# Patient Record
Sex: MALE | Race: WHITE | NOT HISPANIC OR LATINO | Employment: FULL TIME | URBAN - METROPOLITAN AREA
[De-identification: names, ages, dates, MRNs, and addresses within clinical notes are randomized per-mention and may not be internally consistent; named-entity substitution may affect disease eponyms.]

---

## 2017-05-27 ENCOUNTER — OFFICE VISIT (OUTPATIENT)
Dept: FAMILY MEDICINE | Facility: CLINIC | Age: 57
End: 2017-05-27
Payer: COMMERCIAL

## 2017-05-27 VITALS
DIASTOLIC BLOOD PRESSURE: 108 MMHG | BODY MASS INDEX: 41.8 KG/M2 | WEIGHT: 292 LBS | HEART RATE: 95 BPM | HEIGHT: 70 IN | SYSTOLIC BLOOD PRESSURE: 236 MMHG | TEMPERATURE: 100 F

## 2017-05-27 DIAGNOSIS — R05.9 COUGH: ICD-10-CM

## 2017-05-27 DIAGNOSIS — I16.0 HYPERTENSIVE URGENCY: Primary | ICD-10-CM

## 2017-05-27 DIAGNOSIS — R50.9 FEVER, UNSPECIFIED FEVER CAUSE: ICD-10-CM

## 2017-05-27 PROCEDURE — 99213 OFFICE O/P EST LOW 20 MIN: CPT | Mod: S$GLB,,, | Performed by: FAMILY MEDICINE

## 2017-05-27 PROCEDURE — 99999 PR PBB SHADOW E&M-EST. PATIENT-LVL III: CPT | Mod: PBBFAC,,, | Performed by: FAMILY MEDICINE

## 2017-05-27 RX ORDER — AMINOCAPROIC ACID 500 MG/1
5 TABLET ORAL EVERY 8 HOURS
COMMUNITY
End: 2018-02-12

## 2017-05-27 NOTE — PROGRESS NOTES
Subjective:      Patient ID: Bart Christy Jr. is a 57 y.o. male.    Chief Complaint: Cough; Fever (102 this am); and Headache    HPIeh ahd had a cough and pain in his body.  He had a fever of 102 thi Maximilian.  He had ASA for this and it came down to 101.  He has had a headache.  His bp is very high right now.  His neck is not stiff.  He has no sore throat noted.  He has had a cough and chills.  No diarrhea.  He has had tylenol with a decongestant in it, ASA for this. Coughing makes it worse and nothing makes it better.  He takes medicine for this blood pressure.    Health Maintenance Due   Topic Date Due    Hepatitis C Screening  1960    TETANUS VACCINE  02/23/1980    Pneumococcal PPSV23 (Medium Risk) (1) 02/23/1980    Colonoscopy  02/23/2012    Foot Exam  01/25/2017    Eye Exam  02/24/2017    Hemoglobin A1c  05/14/2017       Past Medical History:  Past Medical History:   Diagnosis Date    Diabetes mellitus     Diabetes mellitus, type 2     Hypertension      History reviewed. No pertinent surgical history.  Review of patient's allergies indicates:   Allergen Reactions    Byetta [exenatide] Other (See Comments)     Nausea, diarrhea, mild abdominal pains.      Current Outpatient Prescriptions on File Prior to Visit   Medication Sig Dispense Refill    aspirin (ECOTRIN) 81 MG EC tablet Take 81 mg by mouth once daily.      atenolol (TENORMIN) 50 MG tablet Take 1 tablet (50 mg total) by mouth once daily. 90 tablet 3    furosemide (LASIX) 40 MG tablet Take 40 mg by mouth once daily. Or PRN      metformin (GLUCOPHAGE) 500 MG tablet Take 2 tablets (1,000 mg total) by mouth 2 (two) times daily with meals. 60 tablet 11    SITagliptan (JANUVIA) 50 MG Tab Take 1 tablet (50 mg total) by mouth once daily. 30 tablet 0    testosterone (ANDROGEL) 1 % (50 mg/5 gram) GlPk Apply topically once daily.      valsartan (DIOVAN) 320 MG tablet Take 1 tablet (320 mg total) by mouth once daily. 30 tablet 0     "[DISCONTINUED] pantoprazole (PROTONIX) 40 MG tablet Take 40 mg by mouth once daily.       No current facility-administered medications on file prior to visit.      Social History     Social History    Marital status:      Spouse name: N/A    Number of children: N/A    Years of education: N/A     Occupational History    Not on file.     Social History Main Topics    Smoking status: Never Smoker    Smokeless tobacco: Not on file    Alcohol use 0.6 oz/week     1 Shots of liquor per week    Drug use: No    Sexual activity: Not on file     Other Topics Concern    Not on file     Social History Narrative    No narrative on file     Family History   Problem Relation Age of Onset    Diabetes Mother     Hyperlipidemia Mother     Hypertension Mother     Heart disease Mother     Colon cancer Father            Review of Systems   Constitutional: Positive for chills and fever.   Respiratory: Positive for cough. Negative for shortness of breath.    Cardiovascular: Positive for chest pain.   Gastrointestinal: Negative for diarrhea.   Neurological: Positive for headaches.       Objective:   BP (!) 236/108 (BP Location: Left arm, Patient Position: Sitting, BP Method: Manual)   Pulse 95   Temp 99.8 °F (37.7 °C) (Oral)   Ht 5' 10" (1.778 m)   Wt 132.5 kg (292 lb)   BMI 41.90 kg/m²     Physical Exam   Constitutional: He appears well-developed and well-nourished. No distress.   HENT:   Head: Normocephalic and atraumatic.   Eyes: EOM are normal. Pupils are equal, round, and reactive to light.   Neck: Normal range of motion. Neck supple. No thyromegaly present.   Cardiovascular: Normal rate, regular rhythm and normal heart sounds.  Exam reveals no gallop and no friction rub.    No murmur heard.  Pulmonary/Chest: Effort normal. No respiratory distress. He has wheezes (he ahs a faint wheeze that clears after several deep breaths.). He has no rales (he has coarse breath sounds left greater than right. ).   Skin: " He is not diaphoretic.       Assessment:     1. Hypertensive urgency    2. Fever, unspecified fever cause    3. Cough        Plan:   I do not have access to blood work, xray or IV medications/monitoring to handle this type of issue in our clinic.  Therefore he was directed to go to the ER for evaluation and management with labs, xray, EKG, and administration of medications to help get his blood pressure into control and to further evaluate the cause of his issues.  The bp may be a hyperdynamic response to his phenylephrine that he had.

## 2018-02-12 PROBLEM — N18.9 CRF (CHRONIC RENAL FAILURE): Status: ACTIVE | Noted: 2018-02-12

## 2018-02-12 PROBLEM — I21.4 NSTEMI (NON-ST ELEVATED MYOCARDIAL INFARCTION): Status: ACTIVE | Noted: 2018-02-12

## 2018-02-13 PROBLEM — I50.9 CHF (CONGESTIVE HEART FAILURE): Status: ACTIVE | Noted: 2018-02-13

## 2018-02-13 PROBLEM — G47.33 OSA (OBSTRUCTIVE SLEEP APNEA): Status: ACTIVE | Noted: 2018-02-13

## 2018-02-15 PROBLEM — N17.9 AKI (ACUTE KIDNEY INJURY): Status: ACTIVE | Noted: 2018-02-15

## 2018-02-17 PROBLEM — J96.01 ACUTE HYPOXEMIC RESPIRATORY FAILURE: Status: ACTIVE | Noted: 2018-02-17

## 2018-02-21 PROBLEM — Z72.820 SLEEP DEPRIVATION: Status: ACTIVE | Noted: 2018-02-21

## 2018-02-23 PROBLEM — R53.1 LEFT-SIDED WEAKNESS: Status: ACTIVE | Noted: 2018-02-23

## 2018-02-23 PROBLEM — I63.9 ACUTE CVA (CEREBROVASCULAR ACCIDENT): Status: ACTIVE | Noted: 2018-02-23

## 2018-02-23 PROBLEM — G93.40 ENCEPHALOPATHY: Status: ACTIVE | Noted: 2018-02-23

## 2018-02-24 PROBLEM — I63.511 ACUTE ISCHEMIC RIGHT MCA STROKE: Status: ACTIVE | Noted: 2018-02-23

## 2018-02-26 PROBLEM — I48.91 ATRIAL FIBRILLATION: Status: ACTIVE | Noted: 2018-02-26

## 2018-02-26 PROBLEM — Z72.820 SLEEP DEPRIVATION: Status: RESOLVED | Noted: 2018-02-21 | Resolved: 2018-02-26

## 2018-02-27 PROBLEM — N39.0 UTI (URINARY TRACT INFECTION): Status: ACTIVE | Noted: 2018-02-27

## 2018-02-28 PROBLEM — I31.4 PERICARDIAL EFFUSION WITH CARDIAC TAMPONADE: Status: ACTIVE | Noted: 2018-02-28

## 2018-02-28 PROBLEM — I31.39 PERICARDIAL EFFUSION WITH CARDIAC TAMPONADE: Status: ACTIVE | Noted: 2018-02-28

## 2018-02-28 PROBLEM — N18.9 CRF (CHRONIC RENAL FAILURE): Status: RESOLVED | Noted: 2018-02-12 | Resolved: 2018-02-28

## 2018-03-01 PROBLEM — R63.8 INCREASED NUTRITIONAL NEEDS: Status: ACTIVE | Noted: 2018-03-01

## 2018-03-02 PROBLEM — I31.39 PERICARDIAL EFFUSION WITH CARDIAC TAMPONADE: Status: ACTIVE | Noted: 2018-03-02

## 2018-03-02 PROBLEM — I31.4 PERICARDIAL EFFUSION WITH CARDIAC TAMPONADE: Status: ACTIVE | Noted: 2018-03-02

## 2018-03-02 PROBLEM — R63.8 INCREASED NUTRITIONAL NEEDS: Status: ACTIVE | Noted: 2018-03-02

## 2018-03-05 PROBLEM — R45.1 AGITATED: Status: ACTIVE | Noted: 2018-03-05

## 2018-03-09 PROBLEM — E63.9 INADEQUATE DIETARY ENERGY INTAKE: Status: ACTIVE | Noted: 2018-03-09

## 2018-03-10 PROBLEM — E63.9 INADEQUATE DIETARY ENERGY INTAKE: Status: ACTIVE | Noted: 2018-03-10

## 2018-03-21 PROBLEM — I50.32 CHRONIC DIASTOLIC CONGESTIVE HEART FAILURE: Status: ACTIVE | Noted: 2018-02-13

## 2018-03-21 PROBLEM — I25.10 CORONARY ARTERY DISEASE DUE TO CALCIFIED CORONARY LESION: Status: ACTIVE | Noted: 2018-03-21

## 2018-03-21 PROBLEM — I25.84 CORONARY ARTERY DISEASE DUE TO CALCIFIED CORONARY LESION: Status: ACTIVE | Noted: 2018-03-21

## 2018-03-21 PROBLEM — I48.0 PAROXYSMAL ATRIAL FIBRILLATION: Status: ACTIVE | Noted: 2018-02-26

## 2018-03-22 ENCOUNTER — OFFICE VISIT (OUTPATIENT)
Dept: VASCULAR SURGERY | Facility: CLINIC | Age: 58
End: 2018-03-22
Payer: COMMERCIAL

## 2018-03-22 VITALS
BODY MASS INDEX: 38.92 KG/M2 | SYSTOLIC BLOOD PRESSURE: 153 MMHG | WEIGHT: 278 LBS | HEIGHT: 71 IN | HEART RATE: 61 BPM | DIASTOLIC BLOOD PRESSURE: 85 MMHG

## 2018-03-22 DIAGNOSIS — Z48.89 ENCOUNTER FOR POST SURGICAL WOUND CHECK: ICD-10-CM

## 2018-03-22 DIAGNOSIS — Z95.1 S/P CABG (CORONARY ARTERY BYPASS GRAFT): Primary | ICD-10-CM

## 2018-03-22 PROCEDURE — 99999 PR PBB SHADOW E&M-EST. PATIENT-LVL III: CPT | Mod: PBBFAC,,, | Performed by: THORACIC SURGERY (CARDIOTHORACIC VASCULAR SURGERY)

## 2018-03-22 PROCEDURE — 99024 POSTOP FOLLOW-UP VISIT: CPT | Mod: S$GLB,,, | Performed by: THORACIC SURGERY (CARDIOTHORACIC VASCULAR SURGERY)

## 2018-03-22 RX ORDER — AMOXICILLIN AND CLAVULANATE POTASSIUM 500; 125 MG/1; MG/1
1 TABLET, FILM COATED ORAL 2 TIMES DAILY
Qty: 10 TABLET | Refills: 0
Start: 2018-03-22 | End: 2018-03-27

## 2018-03-23 NOTE — PROGRESS NOTES
OFFICE NOTE    This patient is a 58-year-old gentleman with severe coronary disease.  He had   coronary artery bypass procedure and this was done as an emergency and was   complicated by a number of problems, which include a small stroke and eventually   pericardial effusion requiring drainage and renal insufficiency, which largely   has resolved as well as his other problems.  He is ambulatory at this point.  He   is home with his wife.  He is scheduled to begin cardiac rehabilitation and his   issues are well described in the EPIC record.    MEDICATIONS:  His medicines are noted.    PHYSICAL EXAMINATION:   VITAL SIGNS:  Today, his vital signs are stable.  CHEST:  Clear.  HEART:  Regular rate and rhythm.  ABDOMEN:  Benign.  EXTREMITIES:  He has some erythema around the vein harvest site on the left leg   and some skin separation just above the knee associated with a small amount of   exudate and drainage according to the patient.  Perfusion to the legs and feet   seems to be satisfactory.    At this point, he has done reasonably well.  He is recovering from a very   complicated postoperative course, but has improved.  He no longer is on   dialysis.  He does have some erythema around the incision on the leg and we will   prescribe antibiotics for this.  He can see us after this on an as needed basis   and hopefully he will continue to improve.  He will have regular followup with   his medical team including cardiologist and the Rehabilitation Service and   hopefully his prognosis will be satisfactory.      BESS  dd: 03/22/2018 09:12:44 (CDT)  td: 03/23/2018 00:22:58 (CDT)  Doc ID   #[id number]  Job ID #[id number]    CC:

## 2018-04-04 ENCOUNTER — TELEPHONE (OUTPATIENT)
Dept: VASCULAR SURGERY | Facility: CLINIC | Age: 58
End: 2018-04-04

## 2018-04-04 NOTE — TELEPHONE ENCOUNTER
----- Message from Anastasia Maxwell sent at 4/4/2018 11:50 AM CDT -----  Contact: wife Tracie Christy [phone]  She would like to know when he can return to work.  Please call her.  Thank you!

## 2018-04-04 NOTE — TELEPHONE ENCOUNTER
Ok to return to work per Dr. Chou. Advised to consult with cardiologist before returning to work. States understanding. To call if have more concerns.

## 2018-04-14 ENCOUNTER — NURSE TRIAGE (OUTPATIENT)
Dept: ADMINISTRATIVE | Facility: CLINIC | Age: 58
End: 2018-04-14

## 2018-04-14 NOTE — TELEPHONE ENCOUNTER
Had cardioversion for afib, CABG x5 on 2/14. Had window on 2/27. Having CP this am. Pt went to flip generator on over shoulders. L sternum hurts, rates pain 7. rec EMS due to cardiac symptoms. Pt states no CP but concern for sternum pain/instability. rec to get check today in ED. EMS warnings given. Call back with questions.     Reason for Disposition   Patient sounds very sick or weak to the triager    Protocols used: ST HEART ATTACK POST-HOSPITALIZATION FOLLOW-UP CALL-A-AH

## 2018-04-16 ENCOUNTER — TELEPHONE (OUTPATIENT)
Dept: VASCULAR SURGERY | Facility: CLINIC | Age: 58
End: 2018-04-16

## 2018-04-16 NOTE — TELEPHONE ENCOUNTER
----- Message from Kristen Medrano sent at 4/14/2018 11:20 AM CDT -----  Contact: Wife  Tracie Christy, wife [phone] calling because patient had bypass surgery 2/14/18 and he went to pull the lever on a generator and states his chest is hurting him very badly. Transferred to on call. Thanks!

## 2018-04-16 NOTE — TELEPHONE ENCOUNTER
I called patient to see how he was doing. He did not go to the ER as advised by the on call nurse. He states he is feeling better, he was just concerned that he may have damaged something in his chest. He is not having any pain today. I told him if he has more chest pain or has SOB we can see him in the clinic later this week, or he can go to the ER

## 2018-04-23 PROBLEM — N17.9 AKI (ACUTE KIDNEY INJURY): Status: RESOLVED | Noted: 2018-02-15 | Resolved: 2018-04-23

## 2018-04-23 PROBLEM — I31.39 PERICARDIAL EFFUSION WITH CARDIAC TAMPONADE: Status: RESOLVED | Noted: 2018-03-02 | Resolved: 2018-04-23

## 2018-04-23 PROBLEM — I25.2 HISTORY OF NON-ST ELEVATION MYOCARDIAL INFARCTION (NSTEMI): Status: ACTIVE | Noted: 2018-02-12

## 2018-04-23 PROBLEM — I31.4 PERICARDIAL EFFUSION WITH CARDIAC TAMPONADE: Status: RESOLVED | Noted: 2018-03-02 | Resolved: 2018-04-23

## 2018-04-23 PROBLEM — N39.0 UTI (URINARY TRACT INFECTION): Status: RESOLVED | Noted: 2018-02-27 | Resolved: 2018-04-23

## 2018-07-12 ENCOUNTER — OFFICE VISIT (OUTPATIENT)
Dept: VASCULAR SURGERY | Facility: CLINIC | Age: 58
End: 2018-07-12
Payer: COMMERCIAL

## 2018-07-12 VITALS
HEIGHT: 70 IN | HEART RATE: 84 BPM | BODY MASS INDEX: 40.52 KG/M2 | WEIGHT: 283 LBS | SYSTOLIC BLOOD PRESSURE: 183 MMHG | DIASTOLIC BLOOD PRESSURE: 90 MMHG

## 2018-07-12 DIAGNOSIS — Z48.89 ENCOUNTER FOR POST SURGICAL WOUND CHECK: Primary | ICD-10-CM

## 2018-07-12 PROCEDURE — 3077F SYST BP >= 140 MM HG: CPT | Mod: CPTII,S$GLB,, | Performed by: THORACIC SURGERY (CARDIOTHORACIC VASCULAR SURGERY)

## 2018-07-12 PROCEDURE — 3008F BODY MASS INDEX DOCD: CPT | Mod: CPTII,S$GLB,, | Performed by: THORACIC SURGERY (CARDIOTHORACIC VASCULAR SURGERY)

## 2018-07-12 PROCEDURE — 99212 OFFICE O/P EST SF 10 MIN: CPT | Mod: S$GLB,,, | Performed by: THORACIC SURGERY (CARDIOTHORACIC VASCULAR SURGERY)

## 2018-07-12 PROCEDURE — 99999 PR PBB SHADOW E&M-EST. PATIENT-LVL III: CPT | Mod: PBBFAC,,, | Performed by: THORACIC SURGERY (CARDIOTHORACIC VASCULAR SURGERY)

## 2018-07-12 PROCEDURE — 3080F DIAST BP >= 90 MM HG: CPT | Mod: CPTII,S$GLB,, | Performed by: THORACIC SURGERY (CARDIOTHORACIC VASCULAR SURGERY)

## 2018-07-12 RX ORDER — CARVEDILOL 25 MG/1
25 TABLET ORAL 2 TIMES DAILY
COMMUNITY
Start: 2018-06-18 | End: 2020-01-01

## 2018-07-12 RX ORDER — INSULIN DETEMIR 100 [IU]/ML
40 INJECTION, SOLUTION SUBCUTANEOUS 2 TIMES DAILY
COMMUNITY
Start: 2018-06-18 | End: 2018-11-29

## 2018-07-13 NOTE — PROGRESS NOTES
This is a 58-year-old gentleman who had emergency coronary bypass for   multivessel coronary disease on 02/14/2018.  His postoperative course was   complicated by a right hemispheric stroke involving the left upper extremity.    This is largely resolved.  The patient also had renal insufficiency and he no   longer requires dialysis.  He is active.  He was concerned now about sternal   movement with coughing and deep breathing.    MEDICATIONS:  Noted.  They are part of the EPIC record.  Her problem list is   reviewed.    On examination of his chest and chest wall, there is no evidence of infection.    With deep cough, he does have some movement of the mid sternum, but there is no   obvious displacement or paradoxical motion.  Abdomen is benign.  Perfusion to   the legs and feet seems to be satisfactory.    At this point, I think ongoing conservative management is warranted.  I told him   to allow healing to proceed for the next two to three months and treat this   conservatively.  If the problem becomes a bigger issue or he is still having   problems after this a period of time to let us know and we will have to reassess   the healing of the sternum.      BESS  dd: 07/12/2018 10:08:14 (CDT)  td: 07/13/2018 03:44:53 (CDT)  Doc ID   #[id number]  Job ID #[id number]    CC:

## 2018-07-17 ENCOUNTER — TELEPHONE (OUTPATIENT)
Dept: VASCULAR SURGERY | Facility: CLINIC | Age: 58
End: 2018-07-17

## 2018-07-17 NOTE — TELEPHONE ENCOUNTER
----- Message from Anuradha Larios sent at 7/17/2018  1:42 PM CDT -----  Type:  Patient Returning Call    Who Called:  Nicki Christy - spouse  Who Left Message for Patient:  Jessie  Does the patient know what this is regarding?:  Appointment   Best Call Back Number:  [phone]  Additional Information:

## 2018-08-01 ENCOUNTER — OFFICE VISIT (OUTPATIENT)
Dept: VASCULAR SURGERY | Facility: CLINIC | Age: 58
End: 2018-08-01
Payer: COMMERCIAL

## 2018-08-01 VITALS — DIASTOLIC BLOOD PRESSURE: 80 MMHG | HEIGHT: 70 IN | SYSTOLIC BLOOD PRESSURE: 160 MMHG | HEART RATE: 66 BPM

## 2018-08-01 DIAGNOSIS — R07.89 STERNAL PAIN: Primary | ICD-10-CM

## 2018-08-01 PROCEDURE — 99213 OFFICE O/P EST LOW 20 MIN: CPT | Mod: S$GLB,,, | Performed by: THORACIC SURGERY (CARDIOTHORACIC VASCULAR SURGERY)

## 2018-08-01 PROCEDURE — 3077F SYST BP >= 140 MM HG: CPT | Mod: CPTII,S$GLB,, | Performed by: THORACIC SURGERY (CARDIOTHORACIC VASCULAR SURGERY)

## 2018-08-01 PROCEDURE — 3079F DIAST BP 80-89 MM HG: CPT | Mod: CPTII,S$GLB,, | Performed by: THORACIC SURGERY (CARDIOTHORACIC VASCULAR SURGERY)

## 2018-08-01 PROCEDURE — 99999 PR PBB SHADOW E&M-EST. PATIENT-LVL II: CPT | Mod: PBBFAC,,, | Performed by: THORACIC SURGERY (CARDIOTHORACIC VASCULAR SURGERY)

## 2018-08-01 RX ORDER — INSULIN LISPRO 100 [IU]/ML
INJECTION, SOLUTION INTRAVENOUS; SUBCUTANEOUS
COMMUNITY
Start: 2018-07-27 | End: 2019-01-02

## 2018-08-02 NOTE — PROGRESS NOTES
This is a 58-year-old gentleman status post coronary artery bypass grafting a   number of months ago.  He had to the protracted postoperative course involving a   renal and respiratory insufficiency.  He also needs a pericardial window.    Nevertheless, he has recovered from all these issues and is quite functional at   this point, but has developed some discomfort in the chest wall and some   movement with coughing and deep breathing at the lower aspect of the previous   sternotomy incision.    PHYSICAL EXAMINATION:  The surgical wounds are clean and dry.  There is no   evidence of infection.  His medicines are noted.  With deep coughing and   breathing, I can appreciate some movement of the lower aspect of the sternum   next, the xiphoid and to the right within the costal cartilages, but there is no   gal herniation or movement of the sternum itself.    PLAN:  At this point, I would continue medical management.  I will at least give   the sternum a few more months to heal.  If it becomes symptomatic issue, then   he will have to have the sternum rewired at some stage, but at this point, I   would not do this.  Conservative management is indicated.      BESS  dd: 08/01/2018 15:17:35 (CDT)  td: 08/02/2018 05:40:50 (CDT)  Doc ID   #[id number]  Job ID #[id number]    CC:

## 2018-10-04 ENCOUNTER — OFFICE VISIT (OUTPATIENT)
Dept: VASCULAR SURGERY | Facility: CLINIC | Age: 58
End: 2018-10-04
Payer: COMMERCIAL

## 2018-10-04 VITALS
BODY MASS INDEX: 40.37 KG/M2 | HEART RATE: 68 BPM | SYSTOLIC BLOOD PRESSURE: 180 MMHG | DIASTOLIC BLOOD PRESSURE: 86 MMHG | HEIGHT: 70 IN | WEIGHT: 282 LBS

## 2018-10-04 DIAGNOSIS — S22.23XS: Primary | ICD-10-CM

## 2018-10-04 PROCEDURE — 99214 OFFICE O/P EST MOD 30 MIN: CPT | Mod: S$GLB,,, | Performed by: THORACIC SURGERY (CARDIOTHORACIC VASCULAR SURGERY)

## 2018-10-04 PROCEDURE — 3077F SYST BP >= 140 MM HG: CPT | Mod: CPTII,S$GLB,, | Performed by: THORACIC SURGERY (CARDIOTHORACIC VASCULAR SURGERY)

## 2018-10-04 PROCEDURE — 99999 PR PBB SHADOW E&M-EST. PATIENT-LVL III: CPT | Mod: PBBFAC,,, | Performed by: THORACIC SURGERY (CARDIOTHORACIC VASCULAR SURGERY)

## 2018-10-04 PROCEDURE — 3079F DIAST BP 80-89 MM HG: CPT | Mod: CPTII,S$GLB,, | Performed by: THORACIC SURGERY (CARDIOTHORACIC VASCULAR SURGERY)

## 2018-10-04 PROCEDURE — 3008F BODY MASS INDEX DOCD: CPT | Mod: CPTII,S$GLB,, | Performed by: THORACIC SURGERY (CARDIOTHORACIC VASCULAR SURGERY)

## 2018-10-04 RX ORDER — PANTOPRAZOLE SODIUM 40 MG/1
40 TABLET, DELAYED RELEASE ORAL
COMMUNITY
Start: 2015-04-14 | End: 2018-10-18

## 2018-10-04 RX ORDER — LOSARTAN POTASSIUM 50 MG/1
50 TABLET ORAL
COMMUNITY
Start: 2014-11-05 | End: 2018-10-18

## 2018-10-04 RX ORDER — GLIPIZIDE 2.5 MG/1
2.5 TABLET, EXTENDED RELEASE ORAL 2 TIMES DAILY
COMMUNITY
Start: 2018-08-21 | End: 2020-01-01

## 2018-10-04 NOTE — PROGRESS NOTES
OFFICE NOTE    This is a 58-year-old gentleman with a history of coronary artery disease and   coronary artery bypass grafting over six months ago.  He comes back to the   office today with pain in the sternum and what he feels as clicking movement   with coughing and deep breathing and pain related to this.  His medicines are   noted.  They are part of recent EPIC records.  His problem list is reviewed.  He   is on Xarelto for episodic atrial fibrillation, but he has not had this   particular arrhythmia for a fairly long period of time.  Medicines are part of   the EPIC record.  He is a longstanding diabetic and hypertensive and he quit   smoking within the last couple of years.    PHYSICAL EXAMINATION:  VITAL SIGNS:  Stable.  HEENT:  Pupils are equal, round, reactive to light.  Nose and throat are clear.  NECK:  Supple.  CHEST:  Clear to auscultation.  HEART:  Regular rate and rhythm.  ABDOMEN:  Benign.  EXTREMITIES:  Femoral pulses are equal.  Perfusion to the legs and feet seems to   be satisfactory.  On closer examination of the sternum, he has had previous   sternotomy.  I can feel a very faint movement with coughing and deep breathing,   but there is no gross dehiscence of the bone.  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  The plan right now is to consider a sternal revision to   relieve his discomfort.      BESS  dd: 10/04/2018 10:44:26 (CDT)  td: 10/05/2018 09:42:28 (CDT)  Doc ID   #[id number]  Job ID #[id number]    CC:

## 2018-10-05 DIAGNOSIS — S22.23XS: Primary | ICD-10-CM

## 2018-10-12 DIAGNOSIS — M96.89 NONUNION OF STERNUM AFTER STERNOTOMY: Primary | ICD-10-CM

## 2018-10-12 RX ORDER — CHLORHEXIDINE GLUCONATE ORAL RINSE 1.2 MG/ML
10 SOLUTION DENTAL
Status: CANCELLED | OUTPATIENT
Start: 2018-10-12

## 2018-10-12 RX ORDER — MUPIROCIN 20 MG/G
OINTMENT TOPICAL
Status: CANCELLED | OUTPATIENT
Start: 2018-10-12

## 2018-10-16 ENCOUNTER — TELEPHONE (OUTPATIENT)
Dept: VASCULAR SURGERY | Facility: CLINIC | Age: 58
End: 2018-10-16

## 2018-10-16 NOTE — TELEPHONE ENCOUNTER
----- Message from Greg Cortez sent at 10/16/2018  8:32 AM CDT -----  Type: Needs Medical Advice    Who Called:  Wife/Nicki    Best Call Back Number: [phone]  Additional Information: Wife calling.  States that she needs to reschedule the patient's pre-op appointment to 10/18

## 2018-10-18 PROBLEM — N18.30 STAGE 3 CHRONIC KIDNEY DISEASE: Status: ACTIVE | Noted: 2018-10-18

## 2018-10-18 PROBLEM — Z86.73 HISTORY OF CVA (CEREBROVASCULAR ACCIDENT): Status: ACTIVE | Noted: 2018-10-18

## 2018-10-22 ENCOUNTER — TELEPHONE (OUTPATIENT)
Dept: VASCULAR SURGERY | Facility: CLINIC | Age: 58
End: 2018-10-22

## 2018-10-22 PROBLEM — I20.0 UNSTABLE ANGINA PECTORIS: Status: ACTIVE | Noted: 2018-10-22

## 2018-10-22 NOTE — TELEPHONE ENCOUNTER
----- Message from Clemencia Courtney sent at 10/22/2018 11:04 AM CDT -----  Contact: wife                                    attn:  Chely  Wife  - Tracie Christy - [phone] is calling to cancel patient's procedure with Dr Chou for this Wednesday 10 24 18 and does not want to reschedule at this time but she is asking to speak with Nurse Chely/please call

## 2018-10-25 ENCOUNTER — TELEPHONE (OUTPATIENT)
Dept: VASCULAR SURGERY | Facility: CLINIC | Age: 58
End: 2018-10-25

## 2018-10-25 NOTE — TELEPHONE ENCOUNTER
----- Message from Candace Fernandez sent at 10/25/2018  1:31 PM CDT -----  Type: Needs Medical Advice    Who Called:  Carlyn Pedro BCBRADEN    Best Call Back Number: [phone] ext [phone]  Additional Information: Stating the patient had a scheduled surgery yesterday but was not admitted, wanted to verify if it has been rescheduled

## 2019-01-01 ENCOUNTER — OFFICE VISIT (OUTPATIENT)
Dept: URGENT CARE | Facility: CLINIC | Age: 59
End: 2019-01-01
Payer: COMMERCIAL

## 2019-01-01 ENCOUNTER — TELEPHONE (OUTPATIENT)
Dept: NEUROSURGERY | Facility: CLINIC | Age: 59
End: 2019-01-01

## 2019-01-01 ENCOUNTER — TELEPHONE (OUTPATIENT)
Dept: VASCULAR SURGERY | Facility: CLINIC | Age: 59
End: 2019-01-01

## 2019-01-01 ENCOUNTER — HOSPITAL ENCOUNTER (OUTPATIENT)
Dept: RADIOLOGY | Facility: HOSPITAL | Age: 59
Discharge: HOME | End: 2019-12-12
Attending: ORTHOPAEDIC SURGERY
Payer: COMMERCIAL

## 2019-01-01 ENCOUNTER — PATIENT MESSAGE (OUTPATIENT)
Dept: VASCULAR SURGERY | Facility: CLINIC | Age: 59
End: 2019-01-01

## 2019-01-01 ENCOUNTER — PATIENT OUTREACH (OUTPATIENT)
Dept: ADMINISTRATIVE | Facility: HOSPITAL | Age: 59
End: 2019-01-01

## 2019-01-01 ENCOUNTER — TELEPHONE (OUTPATIENT)
Dept: NEUROLOGY | Facility: CLINIC | Age: 59
End: 2019-01-01

## 2019-01-01 ENCOUNTER — OFFICE VISIT (OUTPATIENT)
Dept: VASCULAR SURGERY | Facility: CLINIC | Age: 59
End: 2019-01-01
Payer: COMMERCIAL

## 2019-01-01 ENCOUNTER — OFFICE VISIT (OUTPATIENT)
Dept: NEUROSURGERY | Facility: CLINIC | Age: 59
End: 2019-01-01
Payer: COMMERCIAL

## 2019-01-01 VITALS
OXYGEN SATURATION: 98 % | HEIGHT: 69 IN | BODY MASS INDEX: 42.06 KG/M2 | RESPIRATION RATE: 18 BRPM | WEIGHT: 284 LBS | HEART RATE: 64 BPM | DIASTOLIC BLOOD PRESSURE: 92 MMHG | TEMPERATURE: 99 F | SYSTOLIC BLOOD PRESSURE: 179 MMHG

## 2019-01-01 VITALS
HEART RATE: 69 BPM | SYSTOLIC BLOOD PRESSURE: 143 MMHG | BODY MASS INDEX: 39.22 KG/M2 | HEIGHT: 70 IN | DIASTOLIC BLOOD PRESSURE: 79 MMHG | WEIGHT: 274 LBS

## 2019-01-01 VITALS
HEART RATE: 70 BPM | WEIGHT: 283.81 LBS | SYSTOLIC BLOOD PRESSURE: 148 MMHG | DIASTOLIC BLOOD PRESSURE: 79 MMHG | BODY MASS INDEX: 41.92 KG/M2

## 2019-01-01 VITALS
SYSTOLIC BLOOD PRESSURE: 154 MMHG | DIASTOLIC BLOOD PRESSURE: 84 MMHG | BODY MASS INDEX: 41.72 KG/M2 | WEIGHT: 291.44 LBS | HEIGHT: 70 IN | HEART RATE: 68 BPM

## 2019-01-01 DIAGNOSIS — M25.511 ACUTE PAIN OF RIGHT SHOULDER: Primary | ICD-10-CM

## 2019-01-01 DIAGNOSIS — S16.1XXA STRAIN OF NECK MUSCLE, INITIAL ENCOUNTER: ICD-10-CM

## 2019-01-01 DIAGNOSIS — S42.012P: Primary | ICD-10-CM

## 2019-01-01 DIAGNOSIS — T81.32XA DISRUPTION OR DEHISCENCE OF CLOSURE OF STERNUM OR STERNOTOMY, INITIAL ENCOUNTER: ICD-10-CM

## 2019-01-01 DIAGNOSIS — I25.10 CORONARY ARTERY DISEASE, ANGINA PRESENCE UNSPECIFIED, UNSPECIFIED VESSEL OR LESION TYPE, UNSPECIFIED WHETHER NATIVE OR TRANSPLANTED HEART: Primary | ICD-10-CM

## 2019-01-01 DIAGNOSIS — S42.016P: Primary | ICD-10-CM

## 2019-01-01 DIAGNOSIS — S42.012P: ICD-10-CM

## 2019-01-01 DIAGNOSIS — M50.30 DDD (DEGENERATIVE DISC DISEASE), CERVICAL: ICD-10-CM

## 2019-01-01 DIAGNOSIS — M54.12 CERVICAL RADICULOPATHY: ICD-10-CM

## 2019-01-01 DIAGNOSIS — M54.12 CERVICAL RADICULOPATHY: Primary | ICD-10-CM

## 2019-01-01 DIAGNOSIS — R07.89 STERNAL PAIN: Primary | ICD-10-CM

## 2019-01-01 PROCEDURE — 3008F BODY MASS INDEX DOCD: CPT | Mod: CPTII,S$GLB,, | Performed by: PHYSICIAN ASSISTANT

## 2019-01-01 PROCEDURE — 72141 MRI NECK SPINE W/O DYE: CPT | Mod: 26,,, | Performed by: RADIOLOGY

## 2019-01-01 PROCEDURE — 99244 PR OFFICE CONSULTATION,LEVEL IV: ICD-10-PCS | Mod: S$GLB,,, | Performed by: STUDENT IN AN ORGANIZED HEALTH CARE EDUCATION/TRAINING PROGRAM

## 2019-01-01 PROCEDURE — 3008F PR BODY MASS INDEX (BMI) DOCUMENTED: ICD-10-PCS | Mod: CPTII,S$GLB,, | Performed by: THORACIC SURGERY (CARDIOTHORACIC VASCULAR SURGERY)

## 2019-01-01 PROCEDURE — 99024 PR POST-OP FOLLOW-UP VISIT: ICD-10-PCS | Mod: S$GLB,,, | Performed by: THORACIC SURGERY (CARDIOTHORACIC VASCULAR SURGERY)

## 2019-01-01 PROCEDURE — 3077F SYST BP >= 140 MM HG: CPT | Mod: CPTII,S$GLB,, | Performed by: THORACIC SURGERY (CARDIOTHORACIC VASCULAR SURGERY)

## 2019-01-01 PROCEDURE — 99999 PR PBB SHADOW E&M-EST. PATIENT-LVL IV: ICD-10-PCS | Mod: PBBFAC,,, | Performed by: THORACIC SURGERY (CARDIOTHORACIC VASCULAR SURGERY)

## 2019-01-01 PROCEDURE — 72141 MRI CERVICAL SPINE WITHOUT CONTRAST: ICD-10-PCS | Mod: 26,,, | Performed by: RADIOLOGY

## 2019-01-01 PROCEDURE — 99244 OFF/OP CNSLTJ NEW/EST MOD 40: CPT | Mod: S$GLB,,, | Performed by: STUDENT IN AN ORGANIZED HEALTH CARE EDUCATION/TRAINING PROGRAM

## 2019-01-01 PROCEDURE — 3078F DIAST BP <80 MM HG: CPT | Mod: CPTII,S$GLB,, | Performed by: THORACIC SURGERY (CARDIOTHORACIC VASCULAR SURGERY)

## 2019-01-01 PROCEDURE — 3077F PR MOST RECENT SYSTOLIC BLOOD PRESSURE >= 140 MM HG: ICD-10-PCS | Mod: CPTII,S$GLB,, | Performed by: THORACIC SURGERY (CARDIOTHORACIC VASCULAR SURGERY)

## 2019-01-01 PROCEDURE — 99214 PR OFFICE/OUTPT VISIT, EST, LEVL IV, 30-39 MIN: ICD-10-PCS | Mod: 25,S$GLB,, | Performed by: PHYSICIAN ASSISTANT

## 2019-01-01 PROCEDURE — 99214 OFFICE O/P EST MOD 30 MIN: CPT | Mod: S$GLB,,, | Performed by: THORACIC SURGERY (CARDIOTHORACIC VASCULAR SURGERY)

## 2019-01-01 PROCEDURE — 99024 POSTOP FOLLOW-UP VISIT: CPT | Mod: S$GLB,,, | Performed by: THORACIC SURGERY (CARDIOTHORACIC VASCULAR SURGERY)

## 2019-01-01 PROCEDURE — 3080F DIAST BP >= 90 MM HG: CPT | Mod: CPTII,S$GLB,, | Performed by: PHYSICIAN ASSISTANT

## 2019-01-01 PROCEDURE — 72141 MRI NECK SPINE W/O DYE: CPT | Mod: TC,PO

## 2019-01-01 PROCEDURE — 3080F PR MOST RECENT DIASTOLIC BLOOD PRESSURE >= 90 MM HG: ICD-10-PCS | Mod: CPTII,S$GLB,, | Performed by: PHYSICIAN ASSISTANT

## 2019-01-01 PROCEDURE — 99214 PR OFFICE/OUTPT VISIT, EST, LEVL IV, 30-39 MIN: ICD-10-PCS | Mod: S$GLB,,, | Performed by: THORACIC SURGERY (CARDIOTHORACIC VASCULAR SURGERY)

## 2019-01-01 PROCEDURE — 99999 PR PBB SHADOW E&M-EST. PATIENT-LVL III: ICD-10-PCS | Mod: PBBFAC,,, | Performed by: STUDENT IN AN ORGANIZED HEALTH CARE EDUCATION/TRAINING PROGRAM

## 2019-01-01 PROCEDURE — 3078F PR MOST RECENT DIASTOLIC BLOOD PRESSURE < 80 MM HG: ICD-10-PCS | Mod: CPTII,S$GLB,, | Performed by: THORACIC SURGERY (CARDIOTHORACIC VASCULAR SURGERY)

## 2019-01-01 PROCEDURE — 3077F PR MOST RECENT SYSTOLIC BLOOD PRESSURE >= 140 MM HG: ICD-10-PCS | Mod: CPTII,S$GLB,, | Performed by: PHYSICIAN ASSISTANT

## 2019-01-01 PROCEDURE — 99214 OFFICE O/P EST MOD 30 MIN: CPT | Mod: 25,S$GLB,, | Performed by: PHYSICIAN ASSISTANT

## 2019-01-01 PROCEDURE — 99999 PR PBB SHADOW E&M-EST. PATIENT-LVL III: CPT | Mod: PBBFAC,,, | Performed by: STUDENT IN AN ORGANIZED HEALTH CARE EDUCATION/TRAINING PROGRAM

## 2019-01-01 PROCEDURE — 96372 PR INJECTION,THERAP/PROPH/DIAG2ST, IM OR SUBCUT: ICD-10-PCS | Mod: S$GLB,,, | Performed by: PHYSICIAN ASSISTANT

## 2019-01-01 PROCEDURE — 3008F BODY MASS INDEX DOCD: CPT | Mod: CPTII,S$GLB,, | Performed by: THORACIC SURGERY (CARDIOTHORACIC VASCULAR SURGERY)

## 2019-01-01 PROCEDURE — 3008F PR BODY MASS INDEX (BMI) DOCUMENTED: ICD-10-PCS | Mod: CPTII,S$GLB,, | Performed by: PHYSICIAN ASSISTANT

## 2019-01-01 PROCEDURE — 99999 PR PBB SHADOW E&M-EST. PATIENT-LVL IV: CPT | Mod: PBBFAC,,, | Performed by: THORACIC SURGERY (CARDIOTHORACIC VASCULAR SURGERY)

## 2019-01-01 PROCEDURE — 96372 THER/PROPH/DIAG INJ SC/IM: CPT | Mod: S$GLB,,, | Performed by: PHYSICIAN ASSISTANT

## 2019-01-01 PROCEDURE — 3077F SYST BP >= 140 MM HG: CPT | Mod: CPTII,S$GLB,, | Performed by: PHYSICIAN ASSISTANT

## 2019-01-01 RX ORDER — METHYLPREDNISOLONE 4 MG/1
TABLET ORAL
Qty: 1 PACKAGE | Refills: 0 | Status: SHIPPED | OUTPATIENT
Start: 2019-01-01 | End: 2019-01-01

## 2019-01-01 RX ORDER — BETAMETHASONE SODIUM PHOSPHATE AND BETAMETHASONE ACETATE 3; 3 MG/ML; MG/ML
9 INJECTION, SUSPENSION INTRA-ARTICULAR; INTRALESIONAL; INTRAMUSCULAR; SOFT TISSUE
Status: COMPLETED | OUTPATIENT
Start: 2019-01-01 | End: 2019-01-01

## 2019-01-01 RX ORDER — SEMAGLUTIDE 1.34 MG/ML
INJECTION, SOLUTION SUBCUTANEOUS
COMMUNITY
Start: 2019-01-01

## 2019-01-01 RX ORDER — MUPIROCIN 20 MG/G
OINTMENT TOPICAL
Status: CANCELLED | OUTPATIENT
Start: 2019-01-01

## 2019-01-01 RX ORDER — CHLORHEXIDINE GLUCONATE ORAL RINSE 1.2 MG/ML
10 SOLUTION DENTAL
Status: CANCELLED | OUTPATIENT
Start: 2019-01-01

## 2019-01-01 RX ORDER — OXYCODONE AND ACETAMINOPHEN 10; 325 MG/1; MG/1
TABLET ORAL
Refills: 0 | COMMUNITY
Start: 2019-01-01 | End: 2019-01-01

## 2019-01-01 RX ORDER — INSULIN LISPRO 100 [IU]/ML
INJECTION, SOLUTION INTRAVENOUS; SUBCUTANEOUS 3 TIMES DAILY
COMMUNITY
Start: 2019-01-01

## 2019-01-01 RX ORDER — LIDOCAINE HYDROCHLORIDE 10 MG/ML
1 INJECTION, SOLUTION EPIDURAL; INFILTRATION; INTRACAUDAL; PERINEURAL ONCE
Status: CANCELLED | OUTPATIENT
Start: 2019-01-01 | End: 2019-01-01

## 2019-01-01 RX ORDER — FERROUS GLUCONATE 324(38)MG
324 TABLET ORAL
COMMUNITY
Start: 2019-01-01

## 2019-01-01 RX ORDER — DICLOFENAC SODIUM 10 MG/G
2 GEL TOPICAL 4 TIMES DAILY
Qty: 100 G | Refills: 0 | Status: SHIPPED | OUTPATIENT
Start: 2019-01-01 | End: 2020-01-01 | Stop reason: SDUPTHER

## 2019-01-01 RX ORDER — METHOCARBAMOL 500 MG/1
1000 TABLET, FILM COATED ORAL 3 TIMES DAILY
Qty: 20 TABLET | Refills: 0 | Status: SHIPPED | OUTPATIENT
Start: 2019-01-01 | End: 2020-01-01

## 2019-01-01 RX ADMIN — BETAMETHASONE SODIUM PHOSPHATE AND BETAMETHASONE ACETATE 9 MG: 3; 3 INJECTION, SUSPENSION INTRA-ARTICULAR; INTRALESIONAL; INTRAMUSCULAR; SOFT TISSUE at 12:11

## 2019-07-02 PROBLEM — D50.9 FE DEFICIENCY ANEMIA: Status: ACTIVE | Noted: 2019-01-01

## 2019-07-02 PROBLEM — D12.6 ADENOMATOUS COLON POLYP: Status: ACTIVE | Noted: 2019-01-01

## 2019-07-18 PROBLEM — T81.32XA DEHISCENCE OF CLOSURE OF STERNUM OR STERNOTOMY: Status: ACTIVE | Noted: 2019-01-01

## 2019-07-18 PROBLEM — S22.5XXA: Status: ACTIVE | Noted: 2019-01-01

## 2019-07-18 NOTE — TELEPHONE ENCOUNTER
----- Message from Ita Lee sent at 7/18/2019 10:44 AM CDT -----  Type:  Sooner Apoointment Request    Caller is requesting a sooner appointment.  Caller declined first available appointment listed below.  Caller will not accept being placed on the waitlist and is requesting a message be sent to doctor.    Name of Caller:  Wife - Tracie Christy   When is the first available appointment?    Symptoms:    Best Call Back Number:  [phone]  Additional Information:  Patient's wife requesting to reschedule procedure.

## 2019-07-18 NOTE — PROGRESS NOTES
This patient is status post remote coronary artery bypass grafting.  He has developed sternal dehiscence.  He has pain and discomfort as well as appreciation of movement with deep breathing and coughing.  His problem list medicines were reviewed.  His review of systems is otherwise fairly unremarkable.  On exam vital signs are stable.  His previous sternotomy incision is well approximated.  He does have clicking and motion with coughing and deep breathing.  His chest is clear to auscultation.  His heart is in a regular rate and rhythm.  His abdomen is benign.  Perfusion to the legs and feet satisfactory.  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  He seems to be quite symptomatic.  We will arrange for sternal plating and fixation hopefully to allow sternal healing.

## 2019-08-21 PROBLEM — S42.012P: Status: ACTIVE | Noted: 2019-01-01

## 2019-08-22 NOTE — TELEPHONE ENCOUNTER
----- Message from Rah Cortez sent at 8/22/2019  8:28 AM CDT -----  Contact: wife-barrett  Type:  Sooner Apoointment Request    Caller is requesting a sooner appointment.  Caller declined first available appointment listed below.  Caller will not accept being placed on the waitlist and is requesting a message be sent to doctor.  Name of Caller:barrett maria  When is the first available appointment?09/05  Symptoms:n/a  Would the patient rather a call back or a response via [facility]? Call back  Best Call Back Number:[phone]  Additional Information: requesting call back regarding getting appt around 08/29 for follow up on procedure that was done on yesterday    Thanks,  Rah Cortez

## 2019-08-30 NOTE — PROGRESS NOTES
This patient is status post revision of previous sternotomy status post coronary artery bypass grafting.  This was done with internal fixation with plating and screws.  Overall he is doing well.  He was concerned about swelling and erythema over the incision.  On physical exam his vital signs are stable.  His surgical wound seems to be totally intact but there is swelling along the course of the incision at the distal aspect of the sternotomy scar.  This is consistent with the dissection and the sternal plating.  I do not see any evidence of infection at this time.  He should continue his prescription for Bactrim as prophylaxis.  He can see me on as-needed basis but hopefully will continue to do well with good sternal healing.

## 2019-11-04 NOTE — PATIENT INSTRUCTIONS
If you were prescribed a narcotic or controlled medication, do not drive or operate heavy equipment or machinery while taking these medications.  You must understand that you've received an Urgent Care treatment only and that you may be released before all your medical problems are known or treated. You, the patient, will arrange for follow up care as instructed.  Follow up with your PCP or specialty clinic as directed if not improved or as needed. You can call [phone] to schedule an appointment with the appropriate provider.  If your condition worsens we recommend that you receive another evaluation at the Emergency Department for any concerns or worsening of condition.  Patient aware and verbalized understanding.    You received a steroid shot today - this can elevate your blood pressure, elevate your blood sugar, water weight gain, nervous energy, redness to the face and dimpling of the skin where the shot goes in.   Patient aware, verbalized understanding and agreed with plan of care.    Recommended no heavy lifting until symptoms resolve.  Medrol Dose Pack RX as prescribed to help reduce inflammation/swelling - START TOMORROW AS DISCUSSED.  Robaxin RX (muscle relaxer) - will make you drowsy, so please do not drive or operate heavy equipment or machinery while taking this medication.  Voltaren RX as prescribed for pain as needed.  You may do gently stretching if tolerable.  Moist warm compresses to area several times daily.   Advised patient to use a Heating pad at home on LOW and Warm baths with OTC Epsom Salt as discussed - MAKE SURE YOU DO NOT FALL ASLEEP WITH a HEATING PAD ON.  Do not stay in one position too long. When sleeping on your back, place a pillow under the knees to reduce tension on back. If sleeping on your side, place a pillow between the knees to keep spine in better alignment.   Wear supportive shoes such as tennis shoes for support of the neck and lower back during the day.  Follow-up  with PCP for further evaluation as needed.  Follow-up with Ortho for further evaluation if still experiencing pain as discussed.  If you lose control of any extremity, your bowel and/or bladder, in between your legs by your genitalia and/or rectum, please go to the nearest Emergency Department immediately.  Strict ER precautions given to patient.  Patient aware and verbalized understanding.    Muscle Spasm  A muscle spasm (also called a cramp) is an involuntary muscle contraction. The muscle tightens quickly and strongly. A hard lump may form in the muscle. Muscle spasms are very painful. Read on to learn more about muscle spasms and how to treat and prevent them.    What causes muscles to spasm?  Often, the cause of a muscle spasm is not known. Muscle spasm is due to irritation of muscle fibers. Some things can make a muscle spasm more likely. These include:  · Injury  · Heavy exercise  · Overtired muscles  · A muscle held in one position for a long time  · Dehydration  · Low levels of certain minerals in the body  · Taking certain medications, such as diuretics or water pills  · Certain medical conditions, such as kidney failure or diabetes  · Being pregnant  Stopping a muscle spasm  Muscle spasms often come and go quickly. When a muscle goes into spasm, very gently stretch and massage the muscle. This may help calm the muscle fibers. Then rest the muscle.  Preventing muscle spasms  Although there is little or no evidence that staying hydrated, taking certain vitamins or minerals or stretching works to prevent cramps, these measures may help and have other benefits. Talk to your health care provider about steps to take to avoid muscle spasms. These may include:  · Drinking enough fluids to avoid dehydration, especially when you exercise.  · Taking vitamin or mineral supplements.  · Getting regular exercise.  · Stretching regularly, especially before exercise.  · Limit caffeine and smoking.  · Taking a prescription  muscle relaxant.  When to call your doctor  Call your doctor if you have any of the following:  · Severe cramping  · Cramping that lasts a long time, does not go away with stretching, or keeps coming back  · Pain, tingling, or weakness in the arms or legs  · Pain that wakes you up at night   Date Last Reviewed: 9/1/2015  © 9097-9763 [employer]. [address] [place] [address], [place] [id number]. All rights reserved. This information is not intended as a substitute for professional medical care. Always follow your healthcare professional's instructions.

## 2019-11-04 NOTE — PROGRESS NOTES
"Subjective:       Patient ID: Bart Christy Jr. is a 59 y.o. male.    Vitals:  height is 5' 9" (1.753 m) and weight is 128.8 kg (284 lb). His oral temperature is 98.8 °F (37.1 °C). His blood pressure is 179/92 (abnormal) and his pulse is 64. His respiration is 18 and oxygen saturation is 98%.     Chief Complaint: Shoulder Pain (right)    Patient presents to urgent care today with right shoulder and neck pain x 2 weeks. Patient does not recall any trauma nor injury. Pt has been taking Hydrocodone he had left over from past sx and Tylenol with only mild relief. Patient has an appt scheduled with Ortho on the 17th.    Shoulder Pain    The pain is present in the right shoulder. This is a new problem. The current episode started 1 to 4 weeks ago. There has been no history of extremity trauma. The problem occurs intermittently. The problem has been gradually worsening. The quality of the pain is described as aching. The pain is at a severity of 9/10. The pain is mild. Pertinent negatives include no fever, headaches, inability to bear weight, itching, joint locking, joint swelling, limited range of motion, stiffness, tingling or visual symptoms. The symptoms are aggravated by activity. Treatments tried: hydrocodone and tylenol. The treatment provided mild relief.       Constitution: Negative for chills, sweating, fatigue and fever.   HENT: Negative for ear pain, congestion, sore throat, trouble swallowing and voice change.    Neck: Positive for neck pain. Negative for neck stiffness, painful lymph nodes and neck swelling.   Cardiovascular: Negative for chest pain, leg swelling, palpitations, sob on exertion and passing out.   Eyes: Negative for eye pain, eye redness, photophobia, double vision, blurred vision and eyelid swelling.   Respiratory: Negative for chest tightness, cough, sputum production, bloody sputum, shortness of breath, stridor and wheezing.    Gastrointestinal: Negative for abdominal pain, abdominal " bloating, nausea, vomiting, constipation, diarrhea, heartburn and bowel incontinence.   Genitourinary: Negative for dysuria, frequency, urgency, urine decreased, flank pain, bladder incontinence, hematuria and pelvic pain.   Musculoskeletal: Positive for pain, joint pain and muscle ache. Negative for trauma, joint swelling, abnormal ROM of joint, back pain, pain with walking and muscle cramps.   Skin: Negative for rash and hives.   Allergic/Immunologic: Negative for hives, itching and sneezing.   Neurological: Negative for dizziness, light-headedness, passing out, facial drooping, speech difficulty, loss of balance, headaches, altered mental status, loss of consciousness, seizures and tremors.   Hematologic/Lymphatic: Negative for swollen lymph nodes.   Psychiatric/Behavioral: Negative for altered mental status and nervous/anxious. The patient is not nervous/anxious.        Objective:      Physical Exam   Constitutional: He is oriented to person, place, and time. He appears well-developed and well-nourished. He is cooperative.  Non-toxic appearance. He does not have a sickly appearance. He does not appear ill. No distress.   HENT:   Head: Normocephalic and atraumatic.   Right Ear: Hearing, tympanic membrane, external ear and ear canal normal.   Left Ear: Hearing, tympanic membrane, external ear and ear canal normal.   Nose: Nose normal. No mucosal edema, rhinorrhea or nasal deformity. No epistaxis. Right sinus exhibits no maxillary sinus tenderness and no frontal sinus tenderness. Left sinus exhibits no maxillary sinus tenderness and no frontal sinus tenderness.   Mouth/Throat: Uvula is midline, oropharynx is clear and moist and mucous membranes are normal. No trismus in the jaw. Normal dentition. No uvula swelling. No oropharyngeal exudate, posterior oropharyngeal edema or posterior oropharyngeal erythema.   Eyes: Pupils are equal, round, and reactive to light. Conjunctivae, EOM and lids are normal. No scleral  icterus.   Neck: Trachea normal, normal range of motion, full passive range of motion without pain and phonation normal. Neck supple. No neck rigidity. No edema and no erythema present.   Cardiovascular: Normal rate, regular rhythm, normal heart sounds, intact distal pulses and normal pulses.   Pulmonary/Chest: Effort normal and breath sounds normal. No accessory muscle usage or stridor. No respiratory distress. He has no decreased breath sounds. He has no wheezes. He has no rhonchi. He has no rales.   Abdominal: Normal appearance. There is no tenderness.   Musculoskeletal: He exhibits no edema or deformity.        Right shoulder: He exhibits tenderness, pain and spasm. He exhibits normal range of motion, no bony tenderness, no swelling, no effusion, no crepitus, no deformity, no laceration, normal pulse and normal strength.        Left shoulder: Normal.        Cervical back: He exhibits tenderness, pain and spasm. He exhibits normal range of motion, no bony tenderness, no swelling, no edema, no deformity, no laceration and normal pulse.        Thoracic back: Normal.        Lumbar back: Normal.   FROM to upper and lower extremities bilateral. 5/5  strength and full sensation bilateral. TTP over anterior aspect of R shoulder and cervical paraspinal muscles. No TTP over spinous processes. 2+ pulses bilateral. No pitting edema to bilateral lower extremities. No numbness or tingling. Negative straight leg raise. Able to ambulate without difficulty.   Lymphadenopathy:     He has no cervical adenopathy.   Neurological: He is alert and oriented to person, place, and time. He has normal strength and normal reflexes. No cranial nerve deficit or sensory deficit. He exhibits normal muscle tone. He displays a negative Romberg sign. Coordination and gait normal.   Skin: Skin is warm, dry, intact, not diaphoretic, not pale and no rash. Capillary refill takes less than 2 seconds.   Psychiatric: He has a normal mood and affect.  His speech is normal and behavior is normal. Judgment and thought content normal. Cognition and memory are normal.   Nursing note and vitals reviewed.        Assessment:       1. Acute pain of right shoulder    2. Strain of neck muscle, initial encounter        Plan:         Acute pain of right shoulder  -     betamethasone acetate-betamethasone sodium phosphate injection 9 mg  -     methylPREDNISolone (MEDROL DOSEPACK) 4 mg tablet; Take all pills on one row at one time. 24 hours later take the second row etc till all meds taken  Dispense: 1 Package; Refill: 0  -     methocarbamol (ROBAXIN) 500 MG Tab; Take 2 tablets (1,000 mg total) by mouth 3 (three) times daily.  Dispense: 20 tablet; Refill: 0  -     diclofenac sodium (VOLTAREN) 1 % Gel; Apply 2 g topically 4 (four) times daily.  Dispense: 100 g; Refill: 0    Strain of neck muscle, initial encounter  -     betamethasone acetate-betamethasone sodium phosphate injection 9 mg  -     methylPREDNISolone (MEDROL DOSEPACK) 4 mg tablet; Take all pills on one row at one time. 24 hours later take the second row etc till all meds taken  Dispense: 1 Package; Refill: 0  -     methocarbamol (ROBAXIN) 500 MG Tab; Take 2 tablets (1,000 mg total) by mouth 3 (three) times daily.  Dispense: 20 tablet; Refill: 0  -     diclofenac sodium (VOLTAREN) 1 % Gel; Apply 2 g topically 4 (four) times daily.  Dispense: 100 g; Refill: 0      Patient Instructions     If you were prescribed a narcotic or controlled medication, do not drive or operate heavy equipment or machinery while taking these medications.  You must understand that you've received an Urgent Care treatment only and that you may be released before all your medical problems are known or treated. You, the patient, will arrange for follow up care as instructed.  Follow up with your PCP or specialty clinic as directed if not improved or as needed. You can call [phone] to schedule an appointment with the appropriate  provider.  If your condition worsens we recommend that you receive another evaluation at the Emergency Department for any concerns or worsening of condition.  Patient aware and verbalized understanding.    You received a steroid shot today - this can elevate your blood pressure, elevate your blood sugar, water weight gain, nervous energy, redness to the face and dimpling of the skin where the shot goes in.   Patient aware, verbalized understanding and agreed with plan of care.    Recommended no heavy lifting until symptoms resolve.  Medrol Dose Pack RX as prescribed to help reduce inflammation/swelling - START TOMORROW AS DISCUSSED.  Robaxin RX (muscle relaxer) - will make you drowsy, so please do not drive or operate heavy equipment or machinery while taking this medication.  Voltaren RX as prescribed for pain as needed.  You may do gently stretching if tolerable.  Moist warm compresses to area several times daily.   Advised patient to use a Heating pad at home on LOW and Warm baths with OTC Epsom Salt as discussed - MAKE SURE YOU DO NOT FALL ASLEEP WITH a HEATING PAD ON.  Do not stay in one position too long. When sleeping on your back, place a pillow under the knees to reduce tension on back. If sleeping on your side, place a pillow between the knees to keep spine in better alignment.   Wear supportive shoes such as tennis shoes for support of the neck and lower back during the day.  Follow-up with PCP for further evaluation as needed.  Follow-up with Ortho for further evaluation if still experiencing pain as discussed.  If you lose control of any extremity, your bowel and/or bladder, in between your legs by your genitalia and/or rectum, please go to the nearest Emergency Department immediately.  Strict ER precautions given to patient.  Patient aware and verbalized understanding.    Muscle Spasm  A muscle spasm (also called a cramp) is an involuntary muscle contraction. The muscle tightens quickly and strongly. A  hard lump may form in the muscle. Muscle spasms are very painful. Read on to learn more about muscle spasms and how to treat and prevent them.    What causes muscles to spasm?  Often, the cause of a muscle spasm is not known. Muscle spasm is due to irritation of muscle fibers. Some things can make a muscle spasm more likely. These include:  · Injury  · Heavy exercise  · Overtired muscles  · A muscle held in one position for a long time  · Dehydration  · Low levels of certain minerals in the body  · Taking certain medications, such as diuretics or water pills  · Certain medical conditions, such as kidney failure or diabetes  · Being pregnant  Stopping a muscle spasm  Muscle spasms often come and go quickly. When a muscle goes into spasm, very gently stretch and massage the muscle. This may help calm the muscle fibers. Then rest the muscle.  Preventing muscle spasms  Although there is little or no evidence that staying hydrated, taking certain vitamins or minerals or stretching works to prevent cramps, these measures may help and have other benefits. Talk to your health care provider about steps to take to avoid muscle spasms. These may include:  · Drinking enough fluids to avoid dehydration, especially when you exercise.  · Taking vitamin or mineral supplements.  · Getting regular exercise.  · Stretching regularly, especially before exercise.  · Limit caffeine and smoking.  · Taking a prescription muscle relaxant.  When to call your doctor  Call your doctor if you have any of the following:  · Severe cramping  · Cramping that lasts a long time, does not go away with stretching, or keeps coming back  · Pain, tingling, or weakness in the arms or legs  · Pain that wakes you up at night   Date Last Reviewed: 9/1/2015  © 8558-9701 [employer]. [address] [place] [address], [place] [address]. All rights reserved. This information is not intended as a substitute for professional medical care. Always follow your  healthcare professional's instructions.

## 2019-11-08 NOTE — TELEPHONE ENCOUNTER
----- Message from Kimberlee Abarca sent at 11/8/2019  8:49 AM CST -----  Contact: Wife, Tracie  Type:  Sooner Apoointment Request    Caller is requesting a sooner appointment.  Caller declined first available appointment listed below.  Caller will not accept being placed on the waitlist and is requesting a message be sent to doctor.    Name of Caller:  Tracie Cowart  When is the first available appointment?  Nothing coming up available  Symptoms:  See below  Best Call Back Number:  [phone]  Additional Information: patient was referred (see referral in Epic) for Chronic left shoulder pain,  Paresthesia of both hands, Neck pain--specifically referred to Dr. Zapata but states that they will see anyone that specializes in Neuro--please advise--thank you

## 2019-11-08 NOTE — TELEPHONE ENCOUNTER
Spoke with pt wife Tracie and informed of no available appointments until January schedule opens. Pt added to wait list. Verbalized understanding.

## 2019-11-12 PROBLEM — M50.30 DDD (DEGENERATIVE DISC DISEASE), CERVICAL: Status: ACTIVE | Noted: 2019-01-01

## 2019-11-12 PROBLEM — M54.12 CERVICAL RADICULOPATHY: Status: ACTIVE | Noted: 2019-01-01

## 2019-12-12 NOTE — LETTER
December 12, 2019      [place]  [address] [address] [facility]  [facility] [address]           [place]  [address] [place] [address]  Phone: [phone]  Fax: [phone]          Patient: Bart Christy Jr.   MR Number: [id number]   YOB: 1960   Date of Visit: 12/12/2019       Dear Dr. Zac Foster II:    Thank you for referring Bart Christy to me for evaluation. Attached you will find relevant portions of my assessment and plan of care.    If you have questions, please do not hesitate to call me. I look forward to following Bart Christy along with you.    Sincerely,    Kevin Ventura MD    Enclosure  CC:  No Recipients    If you would like to receive this communication electronically, please contact [email or web address] or [phone] to request more information on [employer] access.    For providers and/or their staff who would like to refer a patient to [facility], please contact us through our one-stop-shop provider referral line, [facility], at [phone].    If you feel you have received this communication in error or would no longer like to receive these types of communications, please e-mail [email or web address]

## 2019-12-12 NOTE — PROGRESS NOTES
[facility] - [facility]  Clinic Consult     Consult Requested By: Zac Foster II, *  PCP: OSWALDO Orellana Jr, MD    SUBJECTIVE:     Chief Complaint:   Chief Complaint   Patient presents with    Cervical Spine Pain (C-spine)     Patient reports cervical pain radaiting into the bilateral shoulders; numbness and tingling present; pain 4/10       History of Present Illness:  Bart Christy Jr. is a 59 y.o. right handed male with CHF, Afib, CAD s/p CABG and stents on Plavix and ASA who presents for evaluation of neck pain. Onset of pain in September 2019.  Patient reports pain begins in the posterior neck and radiates into the posterior shoulders and triceps region bilaterally.  He reports numbness and tingling in bilateral hands.  He reports dexterity issues and gait imbalance only in the morning.  He denies dropping objects.  The tingling is present in all fingers.  He reports the neck pain is associated with headaches.  He has not attended physical therapy received injections with pain management.  Pertinent and Recent history, provider evaluations, imaging and data reviewed in EPIC    VAS 4/10  PHQ 7  Neck Disability Index 28%    Past Medical History:   Diagnosis Date    Anticoagulant long-term use     Atrial fibrillation     CHF (congestive heart failure)     Coronary artery disease     Diabetes mellitus, type 2     Encounter for blood transfusion     Fe deficiency anemia 7/2/2019    Lab 12/12/18    Gastritis 06/12/2019    Hypertension     Non-ST elevated myocardial infarction (non-STEMI)     Pericardial effusion     Polyp of ascending colon 06/12/2019    Polyp of transverse colon 06/12/2019    Sigmoid polyp 06/12/2019    Sleep apnea with use of continuous positive airway pressure (CPAP)     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     Stage 3 chronic kidney disease     Stroke 02/2018    Weakness of left arm      Past Surgical History:   Procedure Laterality Date     ATHERECTOMY  10/22/2018    Procedure: Atherectomy;  Surgeon: Taya Arellano MD;  Location: [facility] CATH;  Service: Cardiology;;    CARDIAC SURGERY      cardiac window      COLONOSCOPY  06/12/2019    per dr saravia   did not say when to repeat     CORONARY ANGIOGRAPHY Left 10/22/2018    Procedure: ANGIOGRAM, CORONARY ARTERY;  Surgeon: Taya Arellano MD;  Location: [facility] CATH;  Service: Cardiology;  Laterality: Left;    CORONARY ANGIOGRAPHY INCLUDING BYPASS GRAFTS WITH CATHETERIZATION OF LEFT HEART N/A 10/22/2018    Procedure: ANGIOGRAM, CORONARY, INCLUDING BYPASS GRAFT, WITH LEFT HEART CATHETERIZATION;  Surgeon: Taya Arellano MD;  Location: [facility] CATH;  Service: Cardiology;  Laterality: N/A;    CORONARY ARTERY BYPASS GRAFT  02/2018    x 5 vessel per Dr. Chou     CORONARY STENT PLACEMENT N/A 10/22/2018    Procedure: INSERTION, STENT, CORONARY ARTERY;  Surgeon: Taya Arellano MD;  Location: [facility] CATH;  Service: Cardiology;  Laterality: N/A;    CORONARY STENT PLACEMENT Right 12/4/2018    Procedure: INSERTION, STENT, CORONARY ARTERY;  Surgeon: Taya Arellano MD;  Location: [facility] CATH;  Service: Cardiology;  Laterality: Right;    ESOPHAGOGASTRODUODENOSCOPY  06/12/2019    per dr saravia  did not say when to repeat    INSERTION OF DIALYSIS CATHETER  10/22/2018    Procedure: Insertion, Catheter, Dialysis;  Surgeon: Taya Arellano MD;  [facility];  Service: Cardiology;;    REPEAT CLOSURE OF STERNAL INCISION N/A 8/21/2019    Procedure: CLOSURE, STERNAL INCISION, REPEAT with sternal plating;  Surgeon: Bob Chou MD;  Location: [facility] OR;  Service: Cardiovascular;  Laterality: N/A;     Family History   Problem Relation Age of Onset    Diabetes Mother     Hyperlipidemia Mother     Hypertension Mother     Heart disease Mother         60s had CABG    Colon cancer Father      Social History     Tobacco Use    Smoking status: Former Smoker     Types: Cigars    Smokeless tobacco: Never Used    Tobacco comment:  occasional cigar    Substance Use Topics    Alcohol use: Yes     Alcohol/week: 2.0 standard drinks     Types: 1 Cans of beer, 1 Shots of liquor per week     Comment: occasional    Drug use: No      Review of patient's allergies indicates:  No Known Allergies    Current Outpatient Medications:     allopurinol (ZYLOPRIM) 100 MG tablet, Take 100 mg by mouth once daily. Take morning of surgery, Disp: , Rfl:     aspirin (ECOTRIN) 81 MG EC tablet, Take 81 mg by mouth once daily. Hold per MD instructions, Disp: , Rfl:     atorvastatin (LIPITOR) 20 MG tablet, Take 20 mg by mouth once daily., Disp: , Rfl:     blood sugar diagnostic Strp, To check BG 1 times daily, to use with insurance preferred meter, Disp: 100 strip, Rfl: 4    blood-glucose meter kit, To check BG 1 times daily, to use with insurance preferred meter, Disp: 1 each, Rfl: 0    carvedilol (COREG) 25 MG tablet, Take 25 mg by mouth 2 (two) times daily. Take morning of surgery, Disp: , Rfl:     clopidogrel (PLAVIX) 75 mg tablet, Take 1 tablet (75 mg total) by mouth once daily., Disp: 90 tablet, Rfl: 3    diclofenac sodium (VOLTAREN) 1 % Gel, Apply 2 g topically 4 (four) times daily., Disp: 100 g, Rfl: 0    ferrous gluconate (FERGON) 324 MG tablet, , Disp: , Rfl:     furosemide (LASIX) 80 MG tablet, Take 80 mg by mouth 2 (two) times daily. Hold morning of surgery, Disp: , Rfl:     glipiZIDE (GLUCOTROL) 2.5 MG TR24, Take 2.5 mg by mouth 2 (two) times daily. Hold night before surgery and hold morning of surgery, Disp: , Rfl:     HUMALOG KWIKPEN INSULIN 100 unit/mL pen, , Disp: , Rfl:     hydrALAZINE (APRESOLINE) 50 MG tablet, Take 0.5 tablets (25 mg total) by mouth every 8 (eight) hours., Disp: 90 tablet, Rfl: 11    lancets Misc, To check BG 2 times daily, to use with insurance preferred meter, Disp: 100 each, Rfl: 4    methocarbamol (ROBAXIN) 500 MG Tab, Take 2 tablets (1,000 mg total) by mouth 3 (three) times daily., Disp: 20 tablet, Rfl: 0     "nitroGLYCERIN (NITROSTAT) 0.4 MG SL tablet, PLACE 1 TABLET UNDER THE TONGUE EVERY 5 MINUTES AS NEEDED FOR CHEST PAIN. (Patient taking differently: PLACE 1 TABLET UNDER THE TONGUE EVERY 5 MINUTES AS NEEDED FOR CHEST PAIN.  --take if needed), Disp: 100 tablet, Rfl: 1    ondansetron (ZOFRAN-ODT) 4 MG TbDL, Take 1 tablet (4 mg total) by mouth every 6 (six) hours as needed., Disp: 12 tablet, Rfl: 0    OZEMPIC 1 mg/dose (2 mg/1.5 mL) PnIj, , Disp: , Rfl:     pantoprazole (PROTONIX) 40 MG tablet, Take 40 mg by mouth once daily. Take morning of surgery, Disp: , Rfl:     sotalol (BETAPACE) 80 MG tablet, TAKE ONE-HALF (1/2) TABLET TWICE A DAY (Patient taking differently: TAKE ONE-HALF (1/2) TABLET TWICE A DAY  --take morning of surgery), Disp: 180 tablet, Rfl: 3    testosterone (ANDROGEL) 1 % (50 mg/5 gram) GlPk, Apply 5 g topically once daily. Hold morning of surgery, Disp: , Rfl:     TRESIBA FLEXTOUCH U-200 200 unit/mL (3 mL) InPn, Inject 100 Units into the skin once daily. Take 50 units morning of surgery ( which is 50% of regular 100 unit dose), Disp: , Rfl:     pen needle, diabetic 32 gauge x 3/16" Ndle, 1 Stick by Misc.(Non-Drug; Combo Route) route 4 (four) times daily as needed (sliding scale results)., Disp: 150 each, Rfl: 11    Review of Systems:   Constitutional: no fever, chills or night sweats. No changes in weight   Eyes: no visual changes   ENT: no nasal congestion or sore throat   Respiratory: no cough or shortness of breath   Cardiovascular: no chest pain or palpitations   Gastrointestinal: no nausea or vomiting   Genitourinary: no hematuria or dysuria   Integument/Breast: no rash or pruritis   Hematologic/Lymphatic: no easy bruising or lymphadenopathy   Musculoskeletal: + myalgias, neck pain  Neurological: no seizures or tremors + paresthesias  Behavioral/Psych: no auditory or visual hallucinations   Endocrine: no heat or cold intolerance         OBJECTIVE:     Vital Signs (Most Recent):  Pulse: 70 " (12/12/19 1033)  BP: (!) 148/79 (12/12/19 1033)    Physical Exam:   General: well developed, well nourished, no distress.   Neurologic: Alert and oriented. Thought content appropriate. GCS 15.   Head: normocephalic, atraumatic  Eyes: EOMI.  Neck: trachea midline, no JVD   Cardiovascular: no LE edema  Pulmonary: normal respirations, no signs of respiratory distress  Abdomen: non-distended  Sensory: intact to light touch throughout  Skin: Skin is warm, dry and intact.    Motor Strength: Moves all extremities spontaneously with good tone. No abnormal movements seen.     Strength  Deltoids Triceps Biceps Wrist Extension Wrist Flexion Hand  Interossei   Upper: R 5/5 5/5 5/5 5/5 5/5 5/5 5/5    L 5/5 5/5 5/5 5/5 5/5 5/5 5/5     Iliopsoas Quadriceps Knee  Flexion Tibialis  anterior Gastro- cnemius EHL    Lower: R 5/5 5/5 5/5 5/5 5/5 5/5     L 5/5 5/5 5/5 5/5 5/5 5/5      DTR's: 0  [place]: absent  Clonus: absent  Positive Tinel right elbow right hand  Gait: normal    Tandem Gait:  Mild difficulty           Able to walk on heels & toes  Cervical Spine:  Decreased ROM due to pain, diffusely TTP        Diagnostic Results:  I have independently reviewed the following imaging:  X-Ray: Reviewed  MRI: Reviewed  Impression  Impression       1. There is multilevel degenerative change including degenerative disc and facet disease.  These findings are present in the setting of a spinal canal which is small on a developmental basis resulting in spinal canal and foraminal stenosis at multiple levels.  Please see above discussion.  2. The internal carotid arteries take a medial retropharyngeal course which is important for surgical planning.      Electronically signed by: Manav Diez MD  Date: 12/12/2019  Time: 10:09           ASSESSMENT/PLAN:     There are no diagnoses linked to this encounter.    Bart Christy JrMurphy is a 59 y.o. male  Presenting with neck and shoulder pain, right proximal arm pain.  He has intermittent  paresthesias involving hand  He is neurologically intact with no locked long track signs  He has not been treated with conservative therapy  He is very complex imaging including cervical degeneration, with congenital cervical stenosis in variable degrees of significant neural foraminal stenosis from C2-T1, he has no cord displacement or cord signal change, mainly foraminal stenosis bilateral  We reviewed his imaging, reviewed in review symptoms.  Recommended conservative treatment he has been referred [facility]  Including interventional pain management consultation treatment options well as an epidural steroid injection and medical management  Clinically, he is not a focal radiculopathy, reasonable focal targeted for surgical intervention    Educated on myeloradiculopathy, when to seek re-eval    He has signs and symptoms of the cubital tunnel as well as carpal tunnel syndrome, worse on the right return orthopedic surgery Dr. Foster for eval            Patient verbalized understanding of plan. Encouraged to call with any questions or concerns.     This note was partially dictated using voice recognition software, so please excuse any errors that were not corrected.

## 2020-01-01 ENCOUNTER — HOSPITAL ENCOUNTER (INPATIENT)
Facility: HOSPITAL | Age: 60
LOS: 2 days | DRG: 064 | End: 2020-03-05
Attending: EMERGENCY MEDICINE | Admitting: PSYCHIATRY & NEUROLOGY
Payer: COMMERCIAL

## 2020-01-01 VITALS
WEIGHT: 299 LBS | HEIGHT: 70 IN | DIASTOLIC BLOOD PRESSURE: 65 MMHG | BODY MASS INDEX: 42.8 KG/M2 | TEMPERATURE: 99 F | SYSTOLIC BLOOD PRESSURE: 143 MMHG

## 2020-01-01 DIAGNOSIS — I61.9 ICH (INTRACEREBRAL HEMORRHAGE): ICD-10-CM

## 2020-01-01 DIAGNOSIS — I61.3: ICD-10-CM

## 2020-01-01 DIAGNOSIS — I10 ESSENTIAL HYPERTENSION: ICD-10-CM

## 2020-01-01 LAB
ABO + RH BLD: NORMAL
ALBUMIN SERPL BCP-MCNC: 3.1 G/DL (ref 3.5–5.2)
ALBUMIN SERPL BCP-MCNC: 3.8 G/DL (ref 3.5–5.2)
ALLENS TEST: ABNORMAL
ALLENS TEST: ABNORMAL
ALP SERPL-CCNC: 111 U/L (ref 55–135)
ALP SERPL-CCNC: 127 U/L (ref 55–135)
ALT SERPL W/O P-5'-P-CCNC: 18 U/L (ref 10–44)
ALT SERPL W/O P-5'-P-CCNC: 24 U/L (ref 10–44)
AMPHET+METHAMPHET UR QL: NORMAL
ANION GAP SERPL CALC-SCNC: 11 MMOL/L (ref 8–16)
ANION GAP SERPL CALC-SCNC: 12 MMOL/L (ref 8–16)
ASCENDING AORTA: 3.65 CM
AST SERPL-CCNC: 14 U/L (ref 10–40)
AST SERPL-CCNC: 16 U/L (ref 10–40)
AV INDEX (PROSTH): 0.73
AV MEAN GRADIENT: 10 MMHG
AV PEAK GRADIENT: 14 MMHG
AV VALVE AREA: 2.28 CM2
AV VELOCITY RATIO: 0.73
BACTERIA #/AREA URNS AUTO: ABNORMAL /HPF
BARBITURATES UR QL SCN>200 NG/ML: NEGATIVE
BASOPHILS # BLD AUTO: 0.03 K/UL (ref 0–0.2)
BASOPHILS # BLD AUTO: 0.04 K/UL (ref 0–0.2)
BASOPHILS NFR BLD: 0.3 % (ref 0–1.9)
BASOPHILS NFR BLD: 0.3 % (ref 0–1.9)
BENZODIAZ UR QL SCN>200 NG/ML: NEGATIVE
BILIRUB SERPL-MCNC: 0.4 MG/DL (ref 0.1–1)
BILIRUB SERPL-MCNC: 0.4 MG/DL (ref 0.1–1)
BILIRUB UR QL STRIP: NEGATIVE
BLD GP AB SCN CELLS X3 SERPL QL: NORMAL
BLD PROD TYP BPU: NORMAL
BLOOD UNIT EXPIRATION DATE: NORMAL
BLOOD UNIT TYPE CODE: 6200
BLOOD UNIT TYPE: NORMAL
BSA FOR ECHO PROCEDURE: 2.59 M2
BUN SERPL-MCNC: 37 MG/DL (ref 6–20)
BUN SERPL-MCNC: 49 MG/DL (ref 6–20)
BZE UR QL SCN: NEGATIVE
CALCIUM SERPL-MCNC: 8.1 MG/DL (ref 8.7–10.5)
CALCIUM SERPL-MCNC: 8.8 MG/DL (ref 8.7–10.5)
CANNABINOIDS UR QL SCN: NEGATIVE
CHLORIDE SERPL-SCNC: 105 MMOL/L (ref 95–110)
CHLORIDE SERPL-SCNC: 109 MMOL/L (ref 95–110)
CHOLEST SERPL-MCNC: 139 MG/DL (ref 120–199)
CHOLEST/HDLC SERPL: 4 {RATIO} (ref 2–5)
CK MB SERPL-MCNC: 3.3 NG/ML (ref 0.1–6.5)
CK MB SERPL-RTO: 2.4 % (ref 0–5)
CK SERPL-CCNC: 138 U/L (ref 20–200)
CLARITY UR REFRACT.AUTO: CLEAR
CO2 SERPL-SCNC: 22 MMOL/L (ref 23–29)
CO2 SERPL-SCNC: 24 MMOL/L (ref 23–29)
CODING SYSTEM: NORMAL
COLOR UR AUTO: ABNORMAL
CREAT SERPL-MCNC: 2.7 MG/DL (ref 0.5–1.4)
CREAT SERPL-MCNC: 2.8 MG/DL (ref 0.5–1.4)
CREAT UR-MCNC: 48 MG/DL (ref 23–375)
CV ECHO LV RWT: 0.47 CM
DELSYS: ABNORMAL
DELSYS: ABNORMAL
DIFFERENTIAL METHOD: ABNORMAL
DIFFERENTIAL METHOD: ABNORMAL
DISPENSE STATUS: NORMAL
DOP CALC AO PEAK VEL: 1.89 M/S
DOP CALC AO VTI: 36.08 CM
DOP CALC LVOT AREA: 3.1 CM2
DOP CALC LVOT DIAMETER: 2 CM
DOP CALC LVOT PEAK VEL: 1.38 M/S
DOP CALC LVOT STROKE VOLUME: 82.39 CM3
DOP CALCLVOT PEAK VEL VTI: 26.24 CM
E WAVE DECELERATION TIME: 274.27 MSEC
E/A RATIO: 0.95
E/E' RATIO: 14 M/S
ECHO LV POSTERIOR WALL: 1.1 CM (ref 0.6–1.1)
EOSINOPHIL # BLD AUTO: 0 K/UL (ref 0–0.5)
EOSINOPHIL # BLD AUTO: 0 K/UL (ref 0–0.5)
EOSINOPHIL NFR BLD: 0.2 % (ref 0–8)
EOSINOPHIL NFR BLD: 0.2 % (ref 0–8)
ERYTHROCYTE [DISTWIDTH] IN BLOOD BY AUTOMATED COUNT: 12.6 % (ref 11.5–14.5)
ERYTHROCYTE [DISTWIDTH] IN BLOOD BY AUTOMATED COUNT: 12.7 % (ref 11.5–14.5)
ERYTHROCYTE [SEDIMENTATION RATE] IN BLOOD BY WESTERGREN METHOD: 12 MM/H
ERYTHROCYTE [SEDIMENTATION RATE] IN BLOOD BY WESTERGREN METHOD: 12 MM/H
EST. GFR  (AFRICAN AMERICAN): 27.5 ML/MIN/1.73 M^2
EST. GFR  (AFRICAN AMERICAN): 28.7 ML/MIN/1.73 M^2
EST. GFR  (NON AFRICAN AMERICAN): 23.8 ML/MIN/1.73 M^2
EST. GFR  (NON AFRICAN AMERICAN): 24.9 ML/MIN/1.73 M^2
ESTIMATED AVG GLUCOSE: 143 MG/DL (ref 68–131)
ETHANOL UR-MCNC: <10 MG/DL
FIO2: 55
FIO2: 60
FRACTIONAL SHORTENING: 39 % (ref 28–44)
GLUCOSE SERPL-MCNC: 107 MG/DL (ref 70–110)
GLUCOSE SERPL-MCNC: 132 MG/DL (ref 70–110)
GLUCOSE SERPL-MCNC: 93 MG/DL (ref 70–110)
GLUCOSE UR QL STRIP: NEGATIVE
HBA1C MFR BLD HPLC: 6.6 % (ref 4–5.6)
HCO3 UR-SCNC: 21.5 MMOL/L (ref 24–28)
HCO3 UR-SCNC: 26.1 MMOL/L (ref 24–28)
HCT VFR BLD AUTO: 40.4 % (ref 40–54)
HCT VFR BLD AUTO: 41.1 % (ref 40–54)
HDLC SERPL-MCNC: 35 MG/DL (ref 40–75)
HDLC SERPL: 25.2 % (ref 20–50)
HGB BLD-MCNC: 13 G/DL (ref 14–18)
HGB BLD-MCNC: 13.5 G/DL (ref 14–18)
HGB UR QL STRIP: NEGATIVE
HYALINE CASTS UR QL AUTO: 5 /LPF
IMM GRANULOCYTES # BLD AUTO: 0.03 K/UL (ref 0–0.04)
IMM GRANULOCYTES # BLD AUTO: 0.05 K/UL (ref 0–0.04)
IMM GRANULOCYTES NFR BLD AUTO: 0.3 % (ref 0–0.5)
IMM GRANULOCYTES NFR BLD AUTO: 0.4 % (ref 0–0.5)
INTERVENTRICULAR SEPTUM: 1.21 CM (ref 0.6–1.1)
KETONES UR QL STRIP: NEGATIVE
LA MAJOR: 5.84 CM
LA MINOR: 5.35 CM
LA WIDTH: 3.8 CM
LDLC SERPL CALC-MCNC: 77.4 MG/DL (ref 63–159)
LEFT ATRIUM SIZE: 3.83 CM
LEFT ATRIUM VOLUME INDEX: 27.9 ML/M2
LEFT ATRIUM VOLUME: 69.08 CM3
LEFT INTERNAL DIMENSION IN SYSTOLE: 2.89 CM (ref 2.1–4)
LEFT VENTRICLE DIASTOLIC VOLUME INDEX: 41.79 ML/M2
LEFT VENTRICLE DIASTOLIC VOLUME: 103.48 ML
LEFT VENTRICLE MASS INDEX: 82 G/M2
LEFT VENTRICLE SYSTOLIC VOLUME INDEX: 12.9 ML/M2
LEFT VENTRICLE SYSTOLIC VOLUME: 31.95 ML
LEFT VENTRICULAR INTERNAL DIMENSION IN DIASTOLE: 4.72 CM (ref 3.5–6)
LEFT VENTRICULAR MASS: 202.17 G
LEUKOCYTE ESTERASE UR QL STRIP: NEGATIVE
LV LATERAL E/E' RATIO: 10.89 M/S
LV SEPTAL E/E' RATIO: 19.6 M/S
LYMPHOCYTES # BLD AUTO: 1.6 K/UL (ref 1–4.8)
LYMPHOCYTES # BLD AUTO: 1.8 K/UL (ref 1–4.8)
LYMPHOCYTES NFR BLD: 13.4 % (ref 18–48)
LYMPHOCYTES NFR BLD: 14.2 % (ref 18–48)
MAGNESIUM SERPL-MCNC: 2 MG/DL (ref 1.6–2.6)
MAGNESIUM SERPL-MCNC: 2.1 MG/DL (ref 1.6–2.6)
MAGNESIUM SERPL-MCNC: 2.1 MG/DL (ref 1.6–2.6)
MCH RBC QN AUTO: 31.1 PG (ref 27–31)
MCH RBC QN AUTO: 31.5 PG (ref 27–31)
MCHC RBC AUTO-ENTMCNC: 32.2 G/DL (ref 32–36)
MCHC RBC AUTO-ENTMCNC: 32.8 G/DL (ref 32–36)
MCV RBC AUTO: 96 FL (ref 82–98)
MCV RBC AUTO: 97 FL (ref 82–98)
METHADONE UR QL SCN>300 NG/ML: NEGATIVE
MICROSCOPIC COMMENT: ABNORMAL
MIN VOL: 5.89
MIN VOL: 7
MODE: ABNORMAL
MODE: ABNORMAL
MONOCYTES # BLD AUTO: 1.1 K/UL (ref 0.3–1)
MONOCYTES # BLD AUTO: 1.2 K/UL (ref 0.3–1)
MONOCYTES NFR BLD: 10.9 % (ref 4–15)
MONOCYTES NFR BLD: 8.3 % (ref 4–15)
MV PEAK A VEL: 1.03 M/S
MV PEAK E VEL: 0.98 M/S
NEUTROPHILS # BLD AUTO: 10.3 K/UL (ref 1.8–7.7)
NEUTROPHILS # BLD AUTO: 8.3 K/UL (ref 1.8–7.7)
NEUTROPHILS NFR BLD: 74.1 % (ref 38–73)
NEUTROPHILS NFR BLD: 77.4 % (ref 38–73)
NITRITE UR QL STRIP: NEGATIVE
NONHDLC SERPL-MCNC: 104 MG/DL
NRBC BLD-RTO: 0 /100 WBC
NRBC BLD-RTO: 0 /100 WBC
OPIATES UR QL SCN: NORMAL
PCO2 BLDA: 46.4 MMHG (ref 35–45)
PCO2 BLDA: 49.5 MMHG (ref 35–45)
PCP UR QL SCN>25 NG/ML: NEGATIVE
PEEP: 8
PEEP: 8
PH SMN: 7.27 [PH] (ref 7.35–7.45)
PH SMN: 7.33 [PH] (ref 7.35–7.45)
PH UR STRIP: 6 [PH] (ref 5–8)
PHOSPHATE SERPL-MCNC: 3.8 MG/DL (ref 2.7–4.5)
PHOSPHATE SERPL-MCNC: 5.3 MG/DL (ref 2.7–4.5)
PHOSPHATE SERPL-MCNC: 6.2 MG/DL (ref 2.7–4.5)
PIP: 22
PLATELET # BLD AUTO: 200 K/UL (ref 150–350)
PLATELET # BLD AUTO: 244 K/UL (ref 150–350)
PMV BLD AUTO: 10.2 FL (ref 9.2–12.9)
PMV BLD AUTO: 10.5 FL (ref 9.2–12.9)
PO2 BLDA: 105 MMHG (ref 80–100)
PO2 BLDA: 108 MMHG (ref 80–100)
POC BE: -5 MMOL/L
POC BE: 0 MMOL/L
POC SATURATED O2: 97 % (ref 95–100)
POC SATURATED O2: 98 % (ref 95–100)
POC TCO2: 23 MMOL/L (ref 23–27)
POC TCO2: 28 MMOL/L (ref 23–27)
POCT GLUCOSE: 104 MG/DL (ref 70–110)
POCT GLUCOSE: 107 MG/DL (ref 70–110)
POCT GLUCOSE: 120 MG/DL (ref 70–110)
POCT GLUCOSE: 127 MG/DL (ref 70–110)
POCT GLUCOSE: 129 MG/DL (ref 70–110)
POCT GLUCOSE: 93 MG/DL (ref 70–110)
POTASSIUM SERPL-SCNC: 3.9 MMOL/L (ref 3.5–5.1)
POTASSIUM SERPL-SCNC: 4.2 MMOL/L (ref 3.5–5.1)
PROT SERPL-MCNC: 6.2 G/DL (ref 6–8.4)
PROT SERPL-MCNC: 6.9 G/DL (ref 6–8.4)
PROT UR QL STRIP: ABNORMAL
RA MAJOR: 4.71 CM
RA WIDTH: 3.9 CM
RBC # BLD AUTO: 4.18 M/UL (ref 4.6–6.2)
RBC # BLD AUTO: 4.29 M/UL (ref 4.6–6.2)
RBC #/AREA URNS AUTO: 1 /HPF (ref 0–4)
RIGHT VENTRICULAR END-DIASTOLIC DIMENSION: 4.05 CM
SAMPLE: ABNORMAL
SAMPLE: ABNORMAL
SINUS: 3.56 CM
SITE: ABNORMAL
SITE: ABNORMAL
SODIUM SERPL-SCNC: 141 MMOL/L (ref 136–145)
SODIUM SERPL-SCNC: 142 MMOL/L (ref 136–145)
SP GR UR STRIP: 1.01 (ref 1–1.03)
SP02: 100
SP02: 99
SQUAMOUS #/AREA URNS AUTO: 0 /HPF
STJ: 3.55 CM
TDI LATERAL: 0.09 M/S
TDI SEPTAL: 0.05 M/S
TDI: 0.07 M/S
TOXICOLOGY INFORMATION: NORMAL
TRANS PLATPHERESIS VOL PATIENT: NORMAL ML
TRICUSPID ANNULAR PLANE SYSTOLIC EXCURSION: 1.51 CM
TRIGL SERPL-MCNC: 133 MG/DL (ref 30–150)
TSH SERPL DL<=0.005 MIU/L-ACNC: 0.93 UIU/ML (ref 0.4–4)
URN SPEC COLLECT METH UR: ABNORMAL
VT: 500
VT: 500
WBC # BLD AUTO: 11.24 K/UL (ref 3.9–12.7)
WBC # BLD AUTO: 13.26 K/UL (ref 3.9–12.7)
WBC #/AREA URNS AUTO: 1 /HPF (ref 0–5)

## 2020-01-01 PROCEDURE — 99223 1ST HOSP IP/OBS HIGH 75: CPT | Mod: ,,, | Performed by: PSYCHIATRY & NEUROLOGY

## 2020-01-01 PROCEDURE — 63600175 PHARM REV CODE 636 W HCPCS: Performed by: PSYCHIATRY & NEUROLOGY

## 2020-01-01 PROCEDURE — 37799 UNLISTED PX VASCULAR SURGERY: CPT

## 2020-01-01 PROCEDURE — 99223 PR INITIAL HOSPITAL CARE,LEVL III: ICD-10-PCS | Mod: ,,, | Performed by: PSYCHIATRY & NEUROLOGY

## 2020-01-01 PROCEDURE — 93010 EKG 12-LEAD: ICD-10-PCS | Mod: ,,, | Performed by: INTERNAL MEDICINE

## 2020-01-01 PROCEDURE — 84100 ASSAY OF PHOSPHORUS: CPT

## 2020-01-01 PROCEDURE — 36620 INSERTION CATHETER ARTERY: CPT | Mod: ,,, | Performed by: PSYCHIATRY & NEUROLOGY

## 2020-01-01 PROCEDURE — 99254 IP/OBS CNSLTJ NEW/EST MOD 60: CPT | Mod: ,,, | Performed by: NEUROLOGICAL SURGERY

## 2020-01-01 PROCEDURE — 25000003 PHARM REV CODE 250: Performed by: PSYCHIATRY & NEUROLOGY

## 2020-01-01 PROCEDURE — 99284 PR EMERGENCY DEPT VISIT,LEVEL IV: ICD-10-PCS | Mod: ,,, | Performed by: PHYSICIAN ASSISTANT

## 2020-01-01 PROCEDURE — 99291 PR CRITICAL CARE, E/M 30-74 MINUTES: ICD-10-PCS | Mod: ,,, | Performed by: PSYCHIATRY & NEUROLOGY

## 2020-01-01 PROCEDURE — 80053 COMPREHEN METABOLIC PANEL: CPT

## 2020-01-01 PROCEDURE — 80307 DRUG TEST PRSMV CHEM ANLYZR: CPT

## 2020-01-01 PROCEDURE — 97802 MEDICAL NUTRITION INDIV IN: CPT

## 2020-01-01 PROCEDURE — 99900026 HC AIRWAY MAINTENANCE (STAT)

## 2020-01-01 PROCEDURE — 25000003 PHARM REV CODE 250: Performed by: EMERGENCY MEDICINE

## 2020-01-01 PROCEDURE — 82553 CREATINE MB FRACTION: CPT

## 2020-01-01 PROCEDURE — 99232 PR SUBSEQUENT HOSPITAL CARE,LEVL II: ICD-10-PCS | Mod: ,,, | Performed by: PSYCHIATRY & NEUROLOGY

## 2020-01-01 PROCEDURE — 82800 BLOOD PH: CPT

## 2020-01-01 PROCEDURE — 27000221 HC OXYGEN, UP TO 24 HOURS

## 2020-01-01 PROCEDURE — 25000003 PHARM REV CODE 250: Performed by: NURSE PRACTITIONER

## 2020-01-01 PROCEDURE — 99232 SBSQ HOSP IP/OBS MODERATE 35: CPT | Mod: ,,, | Performed by: PSYCHIATRY & NEUROLOGY

## 2020-01-01 PROCEDURE — 99291 PR CRITICAL CARE, E/M 30-74 MINUTES: ICD-10-PCS | Mod: 25,,, | Performed by: NURSE PRACTITIONER

## 2020-01-01 PROCEDURE — 20000000 HC ICU ROOM

## 2020-01-01 PROCEDURE — 83036 HEMOGLOBIN GLYCOSYLATED A1C: CPT

## 2020-01-01 PROCEDURE — 99291 CRITICAL CARE FIRST HOUR: CPT | Mod: 25

## 2020-01-01 PROCEDURE — 85025 COMPLETE CBC W/AUTO DIFF WBC: CPT

## 2020-01-01 PROCEDURE — 99284 EMERGENCY DEPT VISIT MOD MDM: CPT | Mod: ,,, | Performed by: PHYSICIAN ASSISTANT

## 2020-01-01 PROCEDURE — 94761 N-INVAS EAR/PLS OXIMETRY MLT: CPT

## 2020-01-01 PROCEDURE — 83735 ASSAY OF MAGNESIUM: CPT

## 2020-01-01 PROCEDURE — 36620 ARTERIAL LINE: ICD-10-PCS | Mod: ,,, | Performed by: PSYCHIATRY & NEUROLOGY

## 2020-01-01 PROCEDURE — P9035 PLATELET PHERES LEUKOREDUCED: HCPCS

## 2020-01-01 PROCEDURE — 99900035 HC TECH TIME PER 15 MIN (STAT)

## 2020-01-01 PROCEDURE — 96365 THER/PROPH/DIAG IV INF INIT: CPT

## 2020-01-01 PROCEDURE — 31500 INSERT EMERGENCY AIRWAY: CPT

## 2020-01-01 PROCEDURE — 97167 OT EVAL HIGH COMPLEX 60 MIN: CPT

## 2020-01-01 PROCEDURE — 63600175 PHARM REV CODE 636 W HCPCS: Performed by: EMERGENCY MEDICINE

## 2020-01-01 PROCEDURE — 99254 PR INITIAL INPATIENT CONSULT,LEVL IV: ICD-10-PCS | Mod: ,,, | Performed by: NEUROLOGICAL SURGERY

## 2020-01-01 PROCEDURE — 82962 GLUCOSE BLOOD TEST: CPT | Mod: 59

## 2020-01-01 PROCEDURE — 82803 BLOOD GASES ANY COMBINATION: CPT

## 2020-01-01 PROCEDURE — 63600175 PHARM REV CODE 636 W HCPCS: Performed by: NURSE PRACTITIONER

## 2020-01-01 PROCEDURE — 99291 CRITICAL CARE FIRST HOUR: CPT | Mod: ,,, | Performed by: PSYCHIATRY & NEUROLOGY

## 2020-01-01 PROCEDURE — 80061 LIPID PANEL: CPT

## 2020-01-01 PROCEDURE — 82550 ASSAY OF CK (CPK): CPT

## 2020-01-01 PROCEDURE — 99291 CRITICAL CARE FIRST HOUR: CPT | Mod: 25,,, | Performed by: NURSE PRACTITIONER

## 2020-01-01 PROCEDURE — 36430 TRANSFUSION BLD/BLD COMPNT: CPT

## 2020-01-01 PROCEDURE — 93010 ELECTROCARDIOGRAM REPORT: CPT | Mod: ,,, | Performed by: INTERNAL MEDICINE

## 2020-01-01 PROCEDURE — 96366 THER/PROPH/DIAG IV INF ADDON: CPT

## 2020-01-01 PROCEDURE — 86850 RBC ANTIBODY SCREEN: CPT

## 2020-01-01 PROCEDURE — 81001 URINALYSIS AUTO W/SCOPE: CPT

## 2020-01-01 PROCEDURE — 84443 ASSAY THYROID STIM HORMONE: CPT

## 2020-01-01 PROCEDURE — 93005 ELECTROCARDIOGRAM TRACING: CPT

## 2020-01-01 PROCEDURE — 94003 VENT MGMT INPAT SUBQ DAY: CPT

## 2020-01-01 RX ORDER — CHLORHEXIDINE GLUCONATE ORAL RINSE 1.2 MG/ML
15 SOLUTION DENTAL 2 TIMES DAILY
Status: DISCONTINUED | OUTPATIENT
Start: 2020-01-01 | End: 2020-01-01

## 2020-01-01 RX ORDER — POLYETHYLENE GLYCOL 3350 17 G/17G
17 POWDER, FOR SOLUTION ORAL DAILY
Status: DISCONTINUED | OUTPATIENT
Start: 2020-01-01 | End: 2020-01-01

## 2020-01-01 RX ORDER — LORAZEPAM 2 MG/ML
2 INJECTION INTRAMUSCULAR EVERY 30 MIN PRN
Status: DISCONTINUED | OUTPATIENT
Start: 2020-01-01 | End: 2020-01-01 | Stop reason: HOSPADM

## 2020-01-01 RX ORDER — HYDROCODONE BITARTRATE AND ACETAMINOPHEN 500; 5 MG/1; MG/1
TABLET ORAL
Status: DISCONTINUED | OUTPATIENT
Start: 2020-01-01 | End: 2020-01-01

## 2020-01-01 RX ORDER — INSULIN ASPART 100 [IU]/ML
1-10 INJECTION, SOLUTION INTRAVENOUS; SUBCUTANEOUS EVERY 6 HOURS PRN
Status: DISCONTINUED | OUTPATIENT
Start: 2020-01-01 | End: 2020-01-01

## 2020-01-01 RX ORDER — NICARDIPINE HYDROCHLORIDE 0.2 MG/ML
5 INJECTION INTRAVENOUS CONTINUOUS
Status: DISCONTINUED | OUTPATIENT
Start: 2020-01-01 | End: 2020-01-01

## 2020-01-01 RX ORDER — PROPOFOL 10 MG/ML
5 INJECTION, EMULSION INTRAVENOUS CONTINUOUS
Status: DISCONTINUED | OUTPATIENT
Start: 2020-01-01 | End: 2020-01-01

## 2020-01-01 RX ORDER — GLIPIZIDE 2.5 MG/1
5 TABLET, EXTENDED RELEASE ORAL
COMMUNITY

## 2020-01-01 RX ORDER — AMOXICILLIN 250 MG
1 CAPSULE ORAL 2 TIMES DAILY
Status: DISCONTINUED | OUTPATIENT
Start: 2020-01-01 | End: 2020-01-01

## 2020-01-01 RX ORDER — GLUCAGON 1 MG
1 KIT INJECTION
Status: DISCONTINUED | OUTPATIENT
Start: 2020-01-01 | End: 2020-01-01

## 2020-01-01 RX ORDER — FAMOTIDINE 20 MG/1
20 TABLET, FILM COATED ORAL 2 TIMES DAILY
Status: DISCONTINUED | OUTPATIENT
Start: 2020-01-01 | End: 2020-01-01

## 2020-01-01 RX ORDER — ACETAMINOPHEN 325 MG/1
650 TABLET ORAL EVERY 6 HOURS PRN
Status: DISCONTINUED | OUTPATIENT
Start: 2020-01-01 | End: 2020-01-01

## 2020-01-01 RX ORDER — FAMOTIDINE 20 MG/1
20 TABLET, FILM COATED ORAL DAILY
Status: DISCONTINUED | OUTPATIENT
Start: 2020-01-01 | End: 2020-01-01

## 2020-01-01 RX ORDER — SODIUM CHLORIDE 9 MG/ML
20 INJECTION, SOLUTION INTRAVENOUS CONTINUOUS
Status: DISCONTINUED | OUTPATIENT
Start: 2020-01-01 | End: 2020-01-01

## 2020-01-01 RX ORDER — NICARDIPINE HYDROCHLORIDE 0.2 MG/ML
2.5 INJECTION INTRAVENOUS CONTINUOUS
Status: DISCONTINUED | OUTPATIENT
Start: 2020-01-01 | End: 2020-01-01

## 2020-01-01 RX ORDER — ATROPINE SULFATE 10 MG/ML
2 SOLUTION/ DROPS OPHTHALMIC EVERY 4 HOURS PRN
Status: DISCONTINUED | OUTPATIENT
Start: 2020-01-01 | End: 2020-01-01 | Stop reason: HOSPADM

## 2020-01-01 RX ORDER — SODIUM CHLORIDE 9 MG/ML
INJECTION, SOLUTION INTRAVENOUS CONTINUOUS
Status: DISCONTINUED | OUTPATIENT
Start: 2020-01-01 | End: 2020-01-01

## 2020-01-01 RX ORDER — LORAZEPAM 2 MG/ML
1 INJECTION INTRAMUSCULAR EVERY 30 MIN PRN
Status: DISCONTINUED | OUTPATIENT
Start: 2020-01-01 | End: 2020-01-01

## 2020-01-01 RX ORDER — SODIUM CHLORIDE 0.9 % (FLUSH) 0.9 %
10 SYRINGE (ML) INJECTION
Status: DISCONTINUED | OUTPATIENT
Start: 2020-01-01 | End: 2020-01-01

## 2020-01-01 RX ORDER — MORPHINE SULFATE 1 MG/ML
2 INJECTION, SOLUTION INTRAVENOUS CONTINUOUS
Status: DISCONTINUED | OUTPATIENT
Start: 2020-01-01 | End: 2020-01-01 | Stop reason: HOSPADM

## 2020-01-01 RX ORDER — ONDANSETRON 2 MG/ML
4 INJECTION INTRAMUSCULAR; INTRAVENOUS EVERY 8 HOURS PRN
Status: DISCONTINUED | OUTPATIENT
Start: 2020-01-01 | End: 2020-01-01

## 2020-01-01 RX ORDER — CARVEDILOL 25 MG/1
25 TABLET ORAL 2 TIMES DAILY WITH MEALS
COMMUNITY

## 2020-01-01 RX ORDER — GLYCOPYRROLATE 0.2 MG/ML
0.1 INJECTION INTRAMUSCULAR; INTRAVENOUS
Status: DISCONTINUED | OUTPATIENT
Start: 2020-01-01 | End: 2020-01-01

## 2020-01-01 RX ADMIN — PROPOFOL 30 MCG/KG/MIN: 10 INJECTION, EMULSION INTRAVENOUS at 11:03

## 2020-01-01 RX ADMIN — CHLORHEXIDINE GLUCONATE 0.12% ORAL RINSE 15 ML: 1.2 LIQUID ORAL at 09:03

## 2020-01-01 RX ADMIN — NICARDIPINE HYDROCHLORIDE 5 MG/HR: 0.2 INJECTION, SOLUTION INTRAVENOUS at 11:03

## 2020-01-01 RX ADMIN — CHLORHEXIDINE GLUCONATE 0.12% ORAL RINSE 15 ML: 1.2 LIQUID ORAL at 11:03

## 2020-01-01 RX ADMIN — PROPOFOL 40 MCG/KG/MIN: 10 INJECTION, EMULSION INTRAVENOUS at 11:03

## 2020-01-01 RX ADMIN — PROPOFOL 40 MCG/KG/MIN: 10 INJECTION, EMULSION INTRAVENOUS at 09:03

## 2020-01-01 RX ADMIN — SODIUM CHLORIDE: 0.9 INJECTION, SOLUTION INTRAVENOUS at 11:03

## 2020-01-01 RX ADMIN — PROPOFOL 40 MCG/KG/MIN: 10 INJECTION, EMULSION INTRAVENOUS at 02:03

## 2020-01-01 RX ADMIN — SODIUM CHLORIDE: 0.9 INJECTION, SOLUTION INTRAVENOUS at 01:03

## 2020-01-01 RX ADMIN — NICARDIPINE HYDROCHLORIDE 15 MG/HR: 0.2 INJECTION, SOLUTION INTRAVENOUS at 04:03

## 2020-01-01 RX ADMIN — SENNOSIDES AND DOCUSATE SODIUM 1 TABLET: 8.6; 5 TABLET ORAL at 09:03

## 2020-01-01 RX ADMIN — FAMOTIDINE 20 MG: 20 TABLET, FILM COATED ORAL at 09:03

## 2020-01-01 RX ADMIN — NICARDIPINE HYDROCHLORIDE 12.5 MG/HR: 0.2 INJECTION, SOLUTION INTRAVENOUS at 06:03

## 2020-01-01 RX ADMIN — PROPOFOL 40 MCG/KG/MIN: 10 INJECTION, EMULSION INTRAVENOUS at 01:03

## 2020-01-01 RX ADMIN — NICARDIPINE HYDROCHLORIDE 10 MG/HR: 0.2 INJECTION, SOLUTION INTRAVENOUS at 11:03

## 2020-01-01 RX ADMIN — NICARDIPINE HYDROCHLORIDE 12.5 MG/HR: 0.2 INJECTION, SOLUTION INTRAVENOUS at 11:03

## 2020-01-01 RX ADMIN — NICARDIPINE HYDROCHLORIDE 15 MG/HR: 0.2 INJECTION, SOLUTION INTRAVENOUS at 06:03

## 2020-01-01 RX ADMIN — LORAZEPAM 2 MG: 2 INJECTION INTRAMUSCULAR; INTRAVENOUS at 02:03

## 2020-01-01 RX ADMIN — ACETAMINOPHEN 650 MG: 325 TABLET ORAL at 09:03

## 2020-01-01 RX ADMIN — PROPOFOL 15 MCG/KG/MIN: 10 INJECTION, EMULSION INTRAVENOUS at 04:03

## 2020-01-01 RX ADMIN — NICARDIPINE HYDROCHLORIDE 7 MG/HR: 0.2 INJECTION, SOLUTION INTRAVENOUS at 07:03

## 2020-01-01 RX ADMIN — SODIUM CHLORIDE: 0.9 INJECTION, SOLUTION INTRAVENOUS at 02:03

## 2020-01-01 RX ADMIN — PROPOFOL 40 MCG/KG/MIN: 10 INJECTION, EMULSION INTRAVENOUS at 06:03

## 2020-01-01 RX ADMIN — NICARDIPINE HYDROCHLORIDE 12.5 MG/HR: 0.2 INJECTION, SOLUTION INTRAVENOUS at 02:03

## 2020-01-01 RX ADMIN — PROPOFOL 40 MCG/KG/MIN: 10 INJECTION, EMULSION INTRAVENOUS at 04:03

## 2020-01-01 RX ADMIN — HUMAN ALBUMIN MICROSPHERES AND PERFLUTREN 0.66 MG: 10; .22 INJECTION, SOLUTION INTRAVENOUS at 01:03

## 2020-01-01 RX ADMIN — PROPOFOL 50 MCG/KG/MIN: 10 INJECTION, EMULSION INTRAVENOUS at 04:03

## 2020-01-01 RX ADMIN — SODIUM CHLORIDE 2650 ML: 0.9 INJECTION, SOLUTION INTRAVENOUS at 04:03

## 2020-01-01 RX ADMIN — PROPOFOL 5 MCG/KG/MIN: 10 INJECTION, EMULSION INTRAVENOUS at 08:03

## 2020-01-01 RX ADMIN — POLYETHYLENE GLYCOL 3350 17 G: 17 POWDER, FOR SOLUTION ORAL at 09:03

## 2020-01-01 RX ADMIN — DEXTROSE: 10 SOLUTION INTRAVENOUS at 09:03

## 2020-01-01 RX ADMIN — MORPHINE SULFATE 2 MG/HR: 1 INJECTION, SOLUTION INTRAVENOUS at 02:03

## 2020-01-28 PROBLEM — R94.39 ABNORMAL STRESS TEST: Status: ACTIVE | Noted: 2020-01-01

## 2020-03-03 PROBLEM — I61.9 ICH (INTRACEREBRAL HEMORRHAGE): Status: ACTIVE | Noted: 2020-01-01

## 2020-03-03 PROBLEM — J96.02 ACUTE RESPIRATORY FAILURE WITH HYPOXIA AND HYPERCAPNIA: Status: ACTIVE | Noted: 2020-01-01

## 2020-03-03 PROBLEM — J96.01 ACUTE RESPIRATORY FAILURE WITH HYPOXIA AND HYPERCAPNIA: Status: ACTIVE | Noted: 2020-01-01

## 2020-03-03 PROBLEM — I61.3: Status: ACTIVE | Noted: 2020-01-01

## 2020-03-03 PROBLEM — G93.5 BRAIN COMPRESSION: Status: ACTIVE | Noted: 2020-01-01

## 2020-03-03 PROBLEM — G47.30 SLEEP APNEA WITH USE OF CONTINUOUS POSITIVE AIRWAY PRESSURE (CPAP): Status: ACTIVE | Noted: 2018-02-13

## 2020-03-03 PROBLEM — Z79.02 ANTIPLATELET OR ANTITHROMBOTIC LONG-TERM USE: Status: ACTIVE | Noted: 2020-01-01

## 2020-03-03 PROBLEM — I61.5 IVH (INTRAVENTRICULAR HEMORRHAGE): Status: ACTIVE | Noted: 2020-01-01

## 2020-03-03 NOTE — SUBJECTIVE & OBJECTIVE
(Not in a hospital admission)    Review of patient's allergies indicates:  No Known Allergies    Past Medical History:   Diagnosis Date    Anticoagulant long-term use     Diabetes mellitus, type 2     Encounter for blood transfusion     Fe deficiency anemia 7/2/2019    Lab 12/12/18    Gastritis 06/12/2019    Polyp of ascending colon 06/12/2019    Polyp of transverse colon 06/12/2019    Sigmoid polyp 06/12/2019    Sleep apnea with use of continuous positive airway pressure (CPAP)     Stage 3 chronic kidney disease     Weakness of left arm      Past Surgical History:   Procedure Laterality Date    ATHERECTOMY  10/22/2018    Procedure: Atherectomy;  Surgeon: Taya Arellano MD;  Location: [facility] CATH;  Service: Cardiology;;    CARDIAC SURGERY      cardiac window      COLONOSCOPY  06/12/2019    per dr saravia   did not say when to repeat     CORONARY ANGIOGRAPHY Left 10/22/2018    Procedure: ANGIOGRAM, CORONARY ARTERY;  Surgeon: Taya Arellano MD;  Location: [facility] CATH;  Service: Cardiology;  Laterality: Left;    CORONARY ANGIOGRAPHY INCLUDING BYPASS GRAFTS WITH CATHETERIZATION OF LEFT HEART N/A 10/22/2018    Procedure: ANGIOGRAM, CORONARY, INCLUDING BYPASS GRAFT, WITH LEFT HEART CATHETERIZATION;  Surgeon: Taya Arellano MD;  Location: [facility] CATH;  Service: Cardiology;  Laterality: N/A;    CORONARY ANGIOGRAPHY INCLUDING BYPASS GRAFTS WITH CATHETERIZATION OF LEFT HEART N/A 1/28/2020    Procedure: ANGIOGRAM, CORONARY, INCLUDING BYPASS GRAFT, WITH LEFT HEART CATHETERIZATION;  Surgeon: Taya Arellano MD;  Location: [facility] CATH;  Service: Cardiology;  Laterality: N/A;    CORONARY ARTERY BYPASS GRAFT  02/2018    x 5 vessel per Dr. Chou     CORONARY STENT PLACEMENT N/A 10/22/2018    Procedure: INSERTION, STENT, CORONARY ARTERY;  Surgeon: Taya Arellano MD;  Location: [facility] CATH;  Service: Cardiology;  Laterality: N/A;    CORONARY STENT PLACEMENT Right 12/4/2018    Procedure: INSERTION, STENT, CORONARY ARTERY;  Surgeon:  Taya Arellano MD;  Location: [facility] CATH;  Service: Cardiology;  Laterality: Right;    ESOPHAGOGASTRODUODENOSCOPY  06/12/2019    per dr saravia  did not say when to repeat    INSERTION OF DIALYSIS CATHETER  10/22/2018    Procedure: Insertion, Catheter, Dialysis;  Surgeon: Taya Arellano MD;  Location: [facility] CATH;  Service: Cardiology;;    LEFT HEART CATHETERIZATION Left 1/28/2020    Procedure: Left heart cath;  Surgeon: Taya Arellano MD;  Location: [facility] CATH;  Service: Cardiology;  Laterality: Left;    Removal of dialysis catheter      REPEAT CLOSURE OF STERNAL INCISION N/A 8/21/2019    Procedure: CLOSURE, STERNAL INCISION, REPEAT with sternal plating;  Surgeon: Bob Chou MD;  Location: [facility] OR;  Service: Cardiovascular;  Laterality: N/A;     Family History     Problem Relation (Age of Onset)    Colon cancer Father    Diabetes Mother    Heart disease Mother    Hyperlipidemia Mother    Hypertension Mother        Tobacco Use    Smoking status: Former Smoker     Types: Cigars    Smokeless tobacco: Never Used    Tobacco comment: occasional cigar    Substance and Sexual Activity    Alcohol use: Yes     Alcohol/week: 2.0 standard drinks     Types: 1 Cans of beer, 1 Shots of liquor per week     Comment: occasional    Drug use: No    Sexual activity: Yes     Partners: Female     Review of Systems   Unable to perform 2/2 mental status  Objective:     Weight: 132.5 kg (292 lb 1.8 oz)  Body mass index is 43.14 kg/m².  Vital Signs (Most Recent):  Temp: 98.9 °F (37.2 °C) (03/03/20 1610)  Pulse: 67 (03/03/20 1659)  Resp: 13 (03/03/20 1659)  BP: (!) 110/56 (03/03/20 1702)  SpO2: 100 % (03/03/20 1659) Vital Signs (24h Range):  Temp:  [97.8 °F (36.6 °C)-98.9 °F (37.2 °C)] 98.9 °F (37.2 °C)  Pulse:  [67-85] 67  Resp:  [13-18] 13  SpO2:  [93 %-100 %] 100 %  BP: (110-269)/() 110/56     Date 03/03/20 0700 - 03/04/20 0659   Shift 3350-3455 0292-1502 3115-5558 24 Hour Total   INTAKE   Shift Total(mL/kg)       OUTPUT    Urine  350  350   Shift Total(mL/kg)  350(2.6)  350(2.6)   Weight (kg)  132.5 132.5 132.5              Vent Mode: PRVC  Oxygen Concentration (%):  [79.8-100] 80.1  Vt Set:  [550 mL-600 mL] 550 mL  PEEP/CPAP:  [8 cmH20] 8 cmH20  Mean Airway Pressure:  [12.8 bsI27-45.8 cmH20] 13.7 cmH20         NG/OG Tube 03/03/20 1628 nasogastric 18 Fr. Right mouth (Active)            Urethral Catheter 03/03/20 1630 Latex 16 Fr. (Active)   Output (mL) 350 mL 3/3/2020  4:30 PM       Neurosurgery Physical Exam   Exam with propofol held  General: well developed, well nourished, mild distress secondary to ET tube   Head: normocephalic  GCS: Motor: 2 /Verbal: T/Eyes: 1 GCS Total: 4T  Cranial nerves: + cough, no corneal, oculocephalic intact  Eyes: pupils fixed and pinpoint bilaterally  Motor Strength: Extends to pain in all 4 extremities. Triple flexing spontaneously in BLE  Clonus: absent  Babinski: absent  Pulses: 2+ and symmetric radial and dorsalis pedis.  Skin: Skin is warm, dry and intact.    Significant Labs:  Recent Labs   Lab 03/03/20  1241 03/03/20  1639     --      --    K 3.7  --      --    CO2 27  --    BUN 31*  --    CREATININE 1.95*  --    CALCIUM 9.1  --    MG  --  2.1     Recent Labs   Lab 03/03/20  1241   WBC 10.90   HGB 15.9   HCT 47.8        Recent Labs   Lab 03/03/20  1241   LABPT 12.6   INR 1.0   APTT 30.2     Microbiology Results (last 7 days)     ** No results found for the last 168 hours. **        All pertinent labs from the last 24 hours have been reviewed.    Significant Diagnostics:  X-ray Chest 1 View    Result Date: 3/3/2020  Standard endotracheal tube placement Electronically signed by: Sami Saravia MD Date:    03/03/2020 Time:    13:12    Ct Head Without Contrast    Result Date: 3/3/2020  This report was flagged in Epic as abnormal. 1. In this patient with known hemorrhage in the region of the brainstem, the hemorrhagic focus appears grossly stable as compared to the most  recent examination allowing for extensive motion artifact.  There is continued blood products within the 4th ventricle and 3rd ventricle noting there has been development of layering blood products within the bilateral lateral ventricles posteriorly.  The ventricular system is slightly increased in size since the previous examination, developing hydrocephalus is of concern.  Please note, there are a few punctate foci of high attenuation in the region of the cerebellum bilaterally, this is suspected to reflect that of motion artifact although follow-up is recommended to exclude additional regions of punctate hemorrhage.  Follow-up is advised. 2. There may be trace blood products along the tentorium versus artifact. 3. Sinus disease. Electronically signed by: Jimi Ambriz MD Date:    03/03/2020 Time:    17:25    Ct Head Without Contrast    Result Date: 3/3/2020  Brainstem measuring 3.7 x 2.8 cm.  Intraventricular extension into the 4th ventricle without proximal hydrocephalus.  Also subarachnoid blood products are identified.  No evidence for midline shift or herniation.  Findings consistent with microangiopathy. Results were given to Dr. Estevez by Dr. Nichols on March 3, 2020 at 13:25 hours.  Two patient identifiers were utilized and read back occurred. Electronically signed by: Rodger Nichols MD Date:    03/03/2020 Time:    13:26

## 2020-03-03 NOTE — ED NOTES
Patient identifiers have been checked and are correct.  Patient placed in a hospital gown.     LOC: The patient is sedated.   SKIN: The skin is warm, dry.   RESPIRATORY: Airway is open and patent. Bilateral chest rise and fall. Respirations are spontaneous, even and unlabored. Normal effort and rate noted. No accessory muscle use noted.   CARDIAC: Patient has a normal rate and rhythm.  ABDOMEN: Soft and non tender to palpation. No distention noted.   NEUROLOGIC: See neuro flow sheet.   MUSCULOSKELETAL: No obvious deformities noted.     Side rails up x2. Family at bedside.

## 2020-03-03 NOTE — HPI
Bart Christy Jr. is a 60 y.o. male with pmhx significant for HTN, DM, CAD and CVA with residual left sided weakness. Pt presents from OSH via medical transport with brainstem hemorrhage. Per chart review- pt was at cardiology office this AM when he had acute onset of right sided weakness, facial droop, and slurring of speech. Per wife, he had been having a severe headache from the day before. He was brought into [facility] ED and began to decompensate, he was intubated for airway protection and sedated. [place] then [place] [facility] [place]. NSGY consulted for evaluation.

## 2020-03-03 NOTE — ED TRIAGE NOTES
Arrives per [facility] from [facility] for Neuro surgery consult. Pt has headbleed- pontine. Pt developed R sided weakness while at cardiolgy appt today, when arrives to ED was snoring and basically unresponsive. Arrives intubated and posturing. On Cardene gtt @ 15 mg/hr and Propofol gtt @ 50 mcg/kg/min. Flight care reports pt was noted to be having a headache 2 days prior to cardiology appt today. On plavix. LKN 1145 am today.

## 2020-03-03 NOTE — HPI
The patient is a 60-year-old male with a PMH of HTN, DMII, CAD, and prior CVA with some left-sided weakness [place] evaluation and management of an ICH.  Patient was at the cardiology office today when he acutely experienced right-sided weakness.  He underwent cardiac angiogram last month and was following up with his Cardiologist today. During visit  he became acutely weak on the right side with slurred speech and facial asymmetry. EMS was called and on arrival to ED he was minimally responsive with sonorous respirations. He was intubated for airway protection and CT head was obtained which revealed Brainstem measuring 3.7 x 2.8 cm.  Intraventricular extension into the 4th ventricle without proximal hydrocephalus.  Also subarachnoid blood products were identified. According to the wife,  the patient reportedly had a recurrent headache onset since yesterday.  No reported chest pain or shortness of breath. Of note, the patient has a history of blood thinner and antiplatelet. DDAVP given prior to arrival. SBP was elevated > 200 mm Hg, cardene initiated. Decision to [place] [facility] [place]. The patient is admitted to [facility] for higher level of

## 2020-03-03 NOTE — PROCEDURES
Bart Christy Jr. is a 60 y.o. male patient.    Temp: 98.9 °F (37.2 °C) (20)  Pulse: 67 (20)  Resp: 13 (20)  BP: (!) 110/56 (20)  SpO2: 100 % (20)  Weight: 132.5 kg (292 lb 1.8 oz) (20)       Arterial Line  Date/Time: 3/3/2020 5:34 PM  Performed by: Chad Harrison MD  Authorized by: Chad Harrison MD   Consent Done: Yes  Consent: Verbal consent obtained.  Consent given by: spouse  Required items: required blood products, implants, devices, and special equipment available  Patient identity confirmed:  and name  Preparation: Patient was prepped and draped in the usual sterile fashion.  Indications: multiple ABGs and hemodynamic monitoring  Location: right radial  Patient sedated: yes  Sedatives: propofol  Noman's test normal: yes  Needle gauge: 20  Number of attempts: 1  Post-procedure: dressing applied  Post-procedure CMS: normal  Patient tolerance: Patient tolerated the procedure well with no immediate complications          Chad Harrison  3/3/2020

## 2020-03-03 NOTE — ED PROVIDER NOTES
Encounter Date: 3/3/2020       History     Chief Complaint   Patient presents with    head bleed     Transfer [place] for [place]bleed.      Patient is 60 year old male with history of HTN, DM, CAD with stents on Plavix transferred to ER from [place] for pontine bleed. He was at his cardiologist's office when he started having right sided weakness. He was brought to the ER at [place], he began displaying AMS and was ultimately intubated and placed on a cardiene drip. He was transported to [facility] ED for neurosurgery evaluation and admission to neuro ICU.         Review of patient's allergies indicates:  No Known Allergies  Past Medical History:   Diagnosis Date    Anticoagulant long-term use     Diabetes mellitus, type 2     Encounter for blood transfusion     Fe deficiency anemia 7/2/2019    Lab 12/12/18    Gastritis 06/12/2019    Polyp of ascending colon 06/12/2019    Polyp of transverse colon 06/12/2019    Sigmoid polyp 06/12/2019    Sleep apnea with use of continuous positive airway pressure (CPAP)     Stage 3 chronic kidney disease     Weakness of left arm      Past Surgical History:   Procedure Laterality Date    ATHERECTOMY  10/22/2018    Procedure: Atherectomy;  Surgeon: Taya Arellano MD;  Location: [facility] CATH;  Service: Cardiology;;    CARDIAC SURGERY      cardiac window      COLONOSCOPY  06/12/2019    per dr saravia   did not say when to repeat     CORONARY ANGIOGRAPHY Left 10/22/2018    Procedure: ANGIOGRAM, CORONARY ARTERY;  Surgeon: Taya Arellano MD;  Location: [facility] CATH;  Service: Cardiology;  Laterality: Left;    CORONARY ANGIOGRAPHY INCLUDING BYPASS GRAFTS WITH CATHETERIZATION OF LEFT HEART N/A 10/22/2018    Procedure: ANGIOGRAM, CORONARY, INCLUDING BYPASS GRAFT, WITH LEFT HEART CATHETERIZATION;  Surgeon: Taya Arellano MD;  Location: [facility] CATH;  Service: Cardiology;  Laterality: N/A;    CORONARY ANGIOGRAPHY INCLUDING BYPASS GRAFTS WITH CATHETERIZATION OF LEFT HEART N/A  1/28/2020    Procedure: ANGIOGRAM, CORONARY, INCLUDING BYPASS GRAFT, WITH LEFT HEART CATHETERIZATION;  Surgeon: Taya Arellano MD;  Location: [facility] CATH;  Service: Cardiology;  Laterality: N/A;    CORONARY ARTERY BYPASS GRAFT  02/2018    x 5 vessel per Dr. Chou     CORONARY STENT PLACEMENT N/A 10/22/2018    Procedure: INSERTION, STENT, CORONARY ARTERY;  Surgeon: Taya Arellano MD;  Location: [facility] CATH;  Service: Cardiology;  Laterality: N/A;    CORONARY STENT PLACEMENT Right 12/4/2018    Procedure: INSERTION, STENT, CORONARY ARTERY;  Surgeon: Taya Arellano MD;  Location: [facility] CATH;  Service: Cardiology;  Laterality: Right;    ESOPHAGOGASTRODUODENOSCOPY  06/12/2019    per dr saravia  did not say when to repeat    INSERTION OF DIALYSIS CATHETER  10/22/2018    Procedure: Insertion, Catheter, Dialysis;  Surgeon: Taya Arellano MD;  Location: [facility] CATH;  Service: Cardiology;;    LEFT HEART CATHETERIZATION Left 1/28/2020    Procedure: Left heart cath;  Surgeon: Taya Arellano MD;  Location: [facility] CATH;  Service: Cardiology;  Laterality: Left;    Removal of dialysis catheter      REPEAT CLOSURE OF STERNAL INCISION N/A 8/21/2019    Procedure: CLOSURE, STERNAL INCISION, REPEAT with sternal plating;  Surgeon: Bob Chou MD;  Location: [facility] OR;  Service: Cardiovascular;  Laterality: N/A;     Family History   Problem Relation Age of Onset    Diabetes Mother     Hyperlipidemia Mother     Hypertension Mother     Heart disease Mother         60s had CABG    Colon cancer Father      Social History     Tobacco Use    Smoking status: Former Smoker     Types: Cigars    Smokeless tobacco: Never Used    Tobacco comment: occasional cigar    Substance Use Topics    Alcohol use: Yes     Alcohol/week: 2.0 standard drinks     Types: 1 Cans of beer, 1 Shots of liquor per week     Comment: occasional    Drug use: No     Review of Systems   Unable to perform ROS: Acuity of condition       Physical Exam     Initial Vitals   BP  Pulse Resp Temp SpO2   -- -- -- -- --      MAP       --         Physical Exam    Nursing note and vitals reviewed.  Constitutional:   Intubated and sedated     Eyes:   Pinpoint bilaterally   No corneal reflex   Musculoskeletal:   Responds to painful stimulus to upper extremities bilaterally    Skin: Skin is warm.         ED Course   Procedures  Labs Reviewed - No data to display       Imaging Results    None          Medical Decision Making:   ED Management:  Urgent evaluation a 60-year-old male who presents with 19 brain bleed on Plavix admitted to neuro ICU.  He is afebrile, sedated, intubated on propofol and Cardene drips.  Discussed case with neuro ICU, vascular Neurology as well as neuro surgery who will consult.  Vascular ICU will admit the patient and they will place orders.  Discussed case with ED MD who verbalizes understanding and is amenable to plan.    5:08 PM discussed with neurosurgery, will come to see the patient.   Other:   I have discussed this case with another health care provider.       <> Summary of the Discussion: Luis                                 Clinical Impression:       ICD-10-CM ICD-9-CM   1. ICH (intracerebral hemorrhage) I61.9 431                                Kathy Rivera PA-C  03/03/20 1728

## 2020-03-03 NOTE — HPI
61 y/o male with a medical history of CAD (s/p CABG X 5 in 2/18, multivessel PCI after graft failure), HTN, diabetes, HLD, right MCA stroke after a CABG (on DAPT) in 2018 presents as a transfer from [facility] intubated and sedated (on propofol) for a brainstem bleed measuring 3.7X 2.8 cm.    Symptoms started today in the Cardiology office.  During examination patient had an acute onset of diaphoresis, right-sided weakness and was also incoherent.  Patient was also complaining of a 10/10 headache since Sunday. Upon arrival to [facility] patient was unresponsive where he was emergently intubated, placed on propofol and transferred here for higher level of care. Before transfer, patient was given 1 unit of platelets as well as 0.03 mcg/kg DDVAP. Repeat CTH at [facility] shows stable hemorrhage however, possible hydrocephalus.  On arrival here, patient was examined on propofol. Spontaneously moving in the left side (both upper and lower extremities) but withdraws to pain on the right.     Please note, wife has mentioned that when patient needs any type of dye for imaging, he needs to get emergently dialyzed.

## 2020-03-04 NOTE — PLAN OF CARE
Problem: Adult Inpatient Plan of Care  Goal: Plan of Care Review  Outcome: Ongoing, Progressing     Recommendations    1. If unable to extubate > 48hrs and propofol continues, recommend TF Peptamen Intense VHP @ 55mL/hr x 24 hrs to provide 1320kcal, 124g protein, and 1009mL free water. TF + propofol meets 105% of EEN and 108% of EPN.   2. If unable to extubate > 48hrs and propofol is discontinued, recommend Impact Peptide 1.5 @ 55mL/hr x 24 hrs to provide 1980kcal, 124g protein, and 1016mL free water. Meets 96% of EEN and 82% of EPN.   3. If able to extubate, ADAT to Diabetic/Cardiac with texture per SLP recommendations.   Goals: Pt to recieve nutrition by RD follow-up.  Nutrition Goal Status: new

## 2020-03-04 NOTE — ASSESSMENT & PLAN NOTE
- See brainstem hemorrhage  - NSGY signed off, as family declined surgical intervention at this time

## 2020-03-04 NOTE — SUBJECTIVE & OBJECTIVE
Past Medical History:   Diagnosis Date    Anticoagulant long-term use     Brainstem hemorrhage, nontraumatic 3/3/2020    Diabetes mellitus, type 2     Encounter for blood transfusion     Fe deficiency anemia 7/2/2019    Lab 12/12/18    Gastritis 06/12/2019    Polyp of ascending colon 06/12/2019    Polyp of transverse colon 06/12/2019    Sigmoid polyp 06/12/2019    Sleep apnea with use of continuous positive airway pressure (CPAP)     Stage 3 chronic kidney disease     Weakness of left arm      Past Surgical History:   Procedure Laterality Date    ATHERECTOMY  10/22/2018    Procedure: Atherectomy;  Surgeon: Taya Arellano MD;  Location: [facility] CATH;  Service: Cardiology;;    CARDIAC SURGERY      cardiac window      COLONOSCOPY  06/12/2019    per dr saravia   did not say when to repeat     CORONARY ANGIOGRAPHY Left 10/22/2018    Procedure: ANGIOGRAM, CORONARY ARTERY;  Surgeon: Taya Arellano MD;  Location: [facility] CATH;  Service: Cardiology;  Laterality: Left;    CORONARY ANGIOGRAPHY INCLUDING BYPASS GRAFTS WITH CATHETERIZATION OF LEFT HEART N/A 10/22/2018    Procedure: ANGIOGRAM, CORONARY, INCLUDING BYPASS GRAFT, WITH LEFT HEART CATHETERIZATION;  Surgeon: Taya Arellano MD;  Location: [facility] CATH;  Service: Cardiology;  Laterality: N/A;    CORONARY ANGIOGRAPHY INCLUDING BYPASS GRAFTS WITH CATHETERIZATION OF LEFT HEART N/A 1/28/2020    Procedure: ANGIOGRAM, CORONARY, INCLUDING BYPASS GRAFT, WITH LEFT HEART CATHETERIZATION;  Surgeon: Taya Arellano MD;  Location: [facility] CATH;  Service: Cardiology;  Laterality: N/A;    CORONARY ARTERY BYPASS GRAFT  02/2018    x 5 vessel per Dr. Chou     CORONARY STENT PLACEMENT N/A 10/22/2018    Procedure: INSERTION, STENT, CORONARY ARTERY;  Surgeon: Taya Arellano MD;  Location: [facility] CATH;  Service: Cardiology;  Laterality: N/A;    CORONARY STENT PLACEMENT Right 12/4/2018    Procedure: INSERTION, STENT, CORONARY ARTERY;  Surgeon: Taya Arellano MD;  Location: [facility] CATH;  Service:  Cardiology;  Laterality: Right;    ESOPHAGOGASTRODUODENOSCOPY  06/12/2019    per dr saravia  did not say when to repeat    INSERTION OF DIALYSIS CATHETER  10/22/2018    Procedure: Insertion, Catheter, Dialysis;  Surgeon: Taya Arellano MD;  Location: [facility] CATH;  Service: Cardiology;;    LEFT HEART CATHETERIZATION Left 1/28/2020    Procedure: Left heart cath;  Surgeon: Taya Arellano MD;  Location: [facility] CATH;  Service: Cardiology;  Laterality: Left;    Removal of dialysis catheter      REPEAT CLOSURE OF STERNAL INCISION N/A 8/21/2019    Procedure: CLOSURE, STERNAL INCISION, REPEAT with sternal plating;  Surgeon: Bob Chou MD;  [facility];  Service: Cardiovascular;  Laterality: N/A;      Current Facility-Administered Medications on File Prior to Encounter   Medication Dose Route Frequency Provider Last Rate Last Dose    [COMPLETED] desmopressin injection 4 mcg  4 mcg Intravenous ED 1 Time Markell Estevez MD   4 mcg at 03/03/20 1445    [DISCONTINUED] 0.9%  NaCl infusion (for blood administration)   Intravenous Q24H PRN Markell Estevez MD        [DISCONTINUED] niCARdipine 40 mg/200 mL infusion  1 mg/hr Intravenous Continuous Markell Estevez MD 75 mL/hr at 03/03/20 1423 15 mg/hr at 03/03/20 1423    [DISCONTINUED] propofol (DIPRIVAN) 10 mg/mL infusion  20 mcg/kg/min Intravenous Continuous Markell Estevez MD 38.9 mL/hr at 03/03/20 1440 50 mcg/kg/min at 03/03/20 1440     Current Outpatient Medications on File Prior to Encounter   Medication Sig Dispense Refill    atorvastatin (LIPITOR) 20 MG tablet Take 20 mg by mouth once daily.      carvediloL (COREG) 25 MG tablet Take 25 mg by mouth 2 (two) times daily with meals.      clopidogrel (PLAVIX) 75 mg tablet Take 1 tablet (75 mg total) by mouth once daily. 90 tablet 3    famotidine (PEPCID) 20 MG tablet Take 20 mg by mouth nightly as needed.       ferrous gluconate (FERGON) 324 MG tablet Take 324 mg by mouth daily with breakfast.     "   furosemide (LASIX) 40 MG tablet Take 80 mg by mouth 2 (two) times daily.       glipiZIDE (GLUCOTROL) 2.5 MG TR24 Take 5 mg by mouth daily with breakfast.      HUMALOG KWIKPEN INSULIN 100 unit/mL pen Inject into the skin 3 (three) times daily.       hydrALAZINE (APRESOLINE) 50 MG tablet Take 0.5 tablets (25 mg total) by mouth every 8 (eight) hours. (Patient taking differently: Take 50 mg by mouth every 8 (eight) hours. ) 90 tablet 11    lancets Misc To check BG 2 times daily, to use with insurance preferred meter 100 each 4    OZEMPIC 1 mg/dose (2 mg/1.5 mL) PnIj Weekly      pantoprazole (PROTONIX) 40 MG tablet Take 40 mg by mouth once daily. Take morning of surgery      sotalol (BETAPACE) 80 MG tablet TAKE ONE-HALF (1/2) TABLET TWICE A DAY (Patient taking differently: TAKE ONE-HALF (1/2) TABLET TWICE A DAY  --take morning of surgery) 180 tablet 3    TRESIBA FLEXTOUCH U-200 200 unit/mL (3 mL) InPn Inject 100 Units into the skin once daily. Take 50 units morning of surgery ( which is 50% of regular 100 unit dose)      [DISCONTINUED] carvediloL (COREG) 12.5 MG tablet Take 12.5 mg by mouth 2 (two) times daily.      allopurinol (ZYLOPRIM) 100 MG tablet Take 100 mg by mouth once daily. Take morning of surgery      aspirin (ECOTRIN) 81 MG EC tablet Take 81 mg by mouth once daily. Hold per MD instructions      BD ULTRA-FINE MORENO PEN NEEDLE 32 gauge x 5/32" Ndle       blood sugar diagnostic Strp To check BG 1 times daily, to use with insurance preferred meter 100 strip 4    blood-glucose meter kit To check BG 1 times daily, to use with insurance preferred meter 1 each 0    diclofenac sodium (VOLTAREN) 1 % Gel Apply 2 g topically 4 (four) times daily as needed. 100 g 5    nitroGLYCERIN (NITROSTAT) 0.4 MG SL tablet PLACE 1 TABLET UNDER THE TONGUE EVERY 5 MINUTES AS NEEDED FOR CHEST PAIN. (Patient taking differently: PLACE 1 TABLET UNDER THE TONGUE EVERY 5 MINUTES AS NEEDED FOR CHEST PAIN.  --take if needed) " 100 tablet 1    ondansetron (ZOFRAN-ODT) 4 MG TbDL Take 1 tablet (4 mg total) by mouth every 6 (six) hours as needed. 12 tablet 0    sildenafil (REVATIO) 20 mg Tab Take 1 tablet (20 mg total) by mouth as needed. Take 5 tablets PRN daily 100 tablet 3    testosterone (ANDROGEL) 1 % (50 mg/5 gram) GlPk Apply 5 g topically once daily. Hold morning of surgery      [DISCONTINUED] carvedilol (COREG) 25 MG tablet Take 25 mg by mouth 2 (two) times daily. Take morning of surgery      [DISCONTINUED] methocarbamol (ROBAXIN) 500 MG Tab Take 2 tablets (1,000 mg total) by mouth 3 (three) times daily. 20 tablet 0      Allergies: Patient has no known allergies.    Family History   Problem Relation Age of Onset    Diabetes Mother     Hyperlipidemia Mother     Hypertension Mother     Heart disease Mother         60s had CABG    Colon cancer Father      Social History     Tobacco Use    Smoking status: Former Smoker     Types: Cigars    Smokeless tobacco: Never Used    Tobacco comment: occasional cigar    Substance Use Topics    Alcohol use: Yes     Alcohol/week: 2.0 standard drinks     Types: 1 Cans of beer, 1 Shots of liquor per week     Comment: occasional    Drug use: No     Review of Systems   Unable to perform ROS: Intubated     Objective:     Vitals:    Temp: 98.6 °F (37 °C)  Pulse: 85  BP: (!) 127/54  MAP (mmHg): 78  Resp: 15  SpO2: 100 %  O2 Device (Oxygen Therapy): ventilator    Temp  Min: 97.8 °F (36.6 °C)  Max: 98.9 °F (37.2 °C)  Pulse  Min: 63  Max: 85  BP  Min: 105/51  Max: 269/123  MAP (mmHg)  Min: 74  Max: 163  Resp  Min: 13  Max: 18  SpO2  Min: 93 %  Max: 100 %  Oxygen Concentration (%)  Min: 79.8  Max: 100    No intake/output data recorded.           Physical Exam   Constitutional: He appears well-developed and well-nourished. He is intubated.   HENT:   Head: Normocephalic and atraumatic.   Eyes: Pupils are equal, round, and reactive to light. EOM are normal.   Neck: Normal range of motion. Neck supple.    Cardiovascular:   Afib history   Pulmonary/Chest: He is intubated.   Abdominal: Soft. Bowel sounds are normal.   Musculoskeletal: Normal range of motion.   Neurological:   Mental Status: eyes not open to stimulus, sedated (paused for assessment)  Intubated  Pupils  2mm, Pinpoint.   +Corneal reflex, +gag, +cough   Spontaneous LS movement  Withdraws on R side       Nursing note and vitals reviewed.    Unable to test orientation, language, memory, judgment, insight, fund of knowledge, hearing, shoulder shrug, tongue protrusion, coordination, gait due to level of consciousness.    Today I personally reviewed pertinent medications, lines/drains/airways, imaging, laboratory results, notably:

## 2020-03-04 NOTE — HOSPITAL COURSE
03/04/2020 Intubated, on cardene and on propofol. Refused EVD at this time and would prefer maximum medical management. Family aware of poor prognosis, awaiting arrival of other [place] members at this time that [place].

## 2020-03-04 NOTE — ASSESSMENT & PLAN NOTE
61 y/o male with CAD (s/p CABG X 5 in 2/18, multivessel PCI after graft failure), HTN, diabetes, HLD, right MCA stroke after a CABG (on DAPT) in 2018 presents as a transfer from [facility] intubated and sedated (on propofol) for a brainstem bleed measuring 3.7X 2.8 cm. Symptoms started today in the cardiology clinic when patient abruptly became incoherent, was diaphoretic and had right sided weakness. He had a preceeding 10/10 headache from Sunday. On arrival to [facility] he was found to be unresponsive. Emergently intubated and sedated on propofol. CTH revealed brainstem bleed measuring 3.7X 2.8 cm. Before transfer here for higher level of care, patient was given 1 U of platelets and ddAVP. On arrival repeat CTH revealed stable hemorrhage however, possible hydrocephalus.  On arrival here, patient was examined on propofol. Spontaneously moving in the left side (both upper and lower extremities) but only withdraws minimally to pain on the right.     Etiology: Most likely hypertensive based on risk factors.     Antithrombotics for secondary stroke prevention: Antiplatelets: None: Hemorrhage any type    Statins for secondary stroke prevention and hyperlipidemia, if present:   Statins: Atorvastatin- 40 mg daily    Aggressive risk factor modification: HTN, DM, HLD     Rehab efforts: The patient has been evaluated by a stroke team provider and the therapy needs have been fully considered based off the presenting complaints and exam findings. The following therapy evaluations are needed: PT evaluate and treat, OT evaluate and treat, SLP evaluate and treat, PM&R evaluate for appropriate placement    Diagnostics ordered/pending: MRI head without contrast to assess brain parenchyma- As patient cannot get dye without dialysis can get MRI to look at vessels however, this is not an optimal source of imaging     VTE prophylaxis: None: Reason for No Pharmacological VTE Prophylaxis: Mechanical prophylaxis: Place SCDs    BP  parameters: ICH: SBP <140

## 2020-03-04 NOTE — CONSULTS
[facility]  Neurosurgery  Consult Note    Consults  Subjective:     Chief Complaint/Reason for Admission: Brainstem hemorrhage    History of Present Illness: Bart Christy Jr. is a 60 y.o. male with pmhx significant for HTN, DM, CAD and CVA with residual left sided weakness. Pt presents from OSH via medical transport with brainstem hemorrhage. Per chart review- pt was at cardiology office this AM when he had acute onset of right sided weakness, facial droop, and slurring of speech. Per wife, he had been having a severe headache from the day before. He was brought into [facility] ED and began to decompensate, he was intubated for airway protection and sedated. He was then transferred to [facility] for higher level of care. NSGY consulted for evaluation.                 (Not in a hospital admission)    Review of patient's allergies indicates:  No Known Allergies    Past Medical History:   Diagnosis Date    Anticoagulant long-term use     Diabetes mellitus, type 2     Encounter for blood transfusion     Fe deficiency anemia 7/2/2019    Lab 12/12/18    Gastritis 06/12/2019    Polyp of ascending colon 06/12/2019    Polyp of transverse colon 06/12/2019    Sigmoid polyp 06/12/2019    Sleep apnea with use of continuous positive airway pressure (CPAP)     Stage 3 chronic kidney disease     Weakness of left arm      Past Surgical History:   Procedure Laterality Date    ATHERECTOMY  10/22/2018    Procedure: Atherectomy;  Surgeon: Taya Arellano MD;  Location: [facility] CATH;  Service: Cardiology;;    CARDIAC SURGERY      cardiac window      COLONOSCOPY  06/12/2019    per dr saravia   did not say when to repeat     CORONARY ANGIOGRAPHY Left 10/22/2018    Procedure: ANGIOGRAM, CORONARY ARTERY;  Surgeon: Taya Arellano MD;  Location: [facility] CATH;  Service: Cardiology;  Laterality: Left;    CORONARY ANGIOGRAPHY INCLUDING BYPASS GRAFTS WITH CATHETERIZATION OF LEFT HEART N/A 10/22/2018    Procedure: ANGIOGRAM,  CORONARY, INCLUDING BYPASS GRAFT, WITH LEFT HEART CATHETERIZATION;  Surgeon: Taya Arellano MD;  Location: [facility] CATH;  Service: Cardiology;  Laterality: N/A;    CORONARY ANGIOGRAPHY INCLUDING BYPASS GRAFTS WITH CATHETERIZATION OF LEFT HEART N/A 1/28/2020    Procedure: ANGIOGRAM, CORONARY, INCLUDING BYPASS GRAFT, WITH LEFT HEART CATHETERIZATION;  Surgeon: Taya Arellano MD;  Location: [facility] CATH;  Service: Cardiology;  Laterality: N/A;    CORONARY ARTERY BYPASS GRAFT  02/2018    x 5 vessel per Dr. Chou     CORONARY STENT PLACEMENT N/A 10/22/2018    Procedure: INSERTION, STENT, CORONARY ARTERY;  Surgeon: Taya Arellano MD;  Location: [facility] CATH;  Service: Cardiology;  Laterality: N/A;    CORONARY STENT PLACEMENT Right 12/4/2018    Procedure: INSERTION, STENT, CORONARY ARTERY;  Surgeon: Taya Arellano MD;  Location: [facility] CATH;  Service: Cardiology;  Laterality: Right;    ESOPHAGOGASTRODUODENOSCOPY  06/12/2019    per dr saravia  did not say when to repeat    INSERTION OF DIALYSIS CATHETER  10/22/2018    Procedure: Insertion, Catheter, Dialysis;  Surgeon: Taya Arellano MD;  Location: [facility] CATH;  Service: Cardiology;;    LEFT HEART CATHETERIZATION Left 1/28/2020    Procedure: Left heart cath;  Surgeon: Taya Arellano MD;  Location: [facility] CATH;  Service: Cardiology;  Laterality: Left;    Removal of dialysis catheter      REPEAT CLOSURE OF STERNAL INCISION N/A 8/21/2019    Procedure: CLOSURE, STERNAL INCISION, REPEAT with sternal plating;  Surgeon: Bob Chou MD;  Location[facility] OR;  Service: Cardiovascular;  Laterality: N/A;     Family History     Problem Relation (Age of Onset)    Colon cancer Father    Diabetes Mother    Heart disease Mother    Hyperlipidemia Mother    Hypertension Mother        Tobacco Use    Smoking status: Former Smoker     Types: Cigars    Smokeless tobacco: Never Used    Tobacco comment: occasional cigar    Substance and Sexual Activity    Alcohol use: Yes     Alcohol/week: 2.0 standard  drinks     Types: 1 Cans of beer, 1 Shots of liquor per week     Comment: occasional    Drug use: No    Sexual activity: Yes     Partners: Female     Review of Systems   Unable to perform 2/2 mental status  Objective:     Weight: 132.5 kg (292 lb 1.8 oz)  Body mass index is 43.14 kg/m².  Vital Signs (Most Recent):  Temp: 98.9 °F (37.2 °C) (03/03/20 1610)  Pulse: 67 (03/03/20 1659)  Resp: 13 (03/03/20 1659)  BP: (!) 110/56 (03/03/20 1702)  SpO2: 100 % (03/03/20 1659) Vital Signs (24h Range):  Temp:  [97.8 °F (36.6 °C)-98.9 °F (37.2 °C)] 98.9 °F (37.2 °C)  Pulse:  [67-85] 67  Resp:  [13-18] 13  SpO2:  [93 %-100 %] 100 %  BP: (110-269)/() 110/56     Date 03/03/20 0700 - 03/04/20 0659   Shift 5182-1872 4034-9139 0994-6468 24 Hour Total   INTAKE   Shift Total(mL/kg)       OUTPUT   Urine  350  350   Shift Total(mL/kg)  350(2.6)  350(2.6)   Weight (kg)  132.5 132.5 132.5              Vent Mode: PRVC  Oxygen Concentration (%):  [79.8-100] 80.1  Vt Set:  [550 mL-600 mL] 550 mL  PEEP/CPAP:  [8 cmH20] 8 cmH20  Mean Airway Pressure:  [12.8 ciW78-41.8 cmH20] 13.7 cmH20         NG/OG Tube 03/03/20 1628 nasogastric 18 Fr. Right mouth (Active)            Urethral Catheter 03/03/20 1630 Latex 16 Fr. (Active)   Output (mL) 350 mL 3/3/2020  4:30 PM       Neurosurgery Physical Exam   Exam with propofol held  General: well developed, well nourished, mild distress secondary to ET tube   Head: normocephalic  GCS: Motor: 2 /Verbal: T/Eyes: 1 GCS Total: 4T  Cranial nerves: + cough, no corneal, oculocephalic intact  Eyes: pupils fixed and pinpoint bilaterally  Motor Strength: Extends to pain in all 4 extremities. Triple flexing spontaneously in BLE  Clonus: absent  Babinski: absent  Pulses: 2+ and symmetric radial and dorsalis pedis.  Skin: Skin is warm, dry and intact.    Significant Labs:  Recent Labs   Lab 03/03/20  1241 03/03/20  1639     --      --    K 3.7  --      --    CO2 27  --    BUN 31*  --     CREATININE 1.95*  --    CALCIUM 9.1  --    MG  --  2.1     Recent Labs   Lab 03/03/20  1241   WBC 10.90   HGB 15.9   HCT 47.8        Recent Labs   Lab 03/03/20  1241   LABPT 12.6   INR 1.0   APTT 30.2     Microbiology Results (last 7 days)     ** No results found for the last 168 hours. **        All pertinent labs from the last 24 hours have been reviewed.    Significant Diagnostics:  X-ray Chest 1 View    Result Date: 3/3/2020  Standard endotracheal tube placement Electronically signed by: Sami Saravia MD Date:    03/03/2020 Time:    13:12    Ct Head Without Contrast    Result Date: 3/3/2020  This report was flagged in Epic as abnormal. 1. In this patient with known hemorrhage in the region of the brainstem, the hemorrhagic focus appears grossly stable as compared to the most recent examination allowing for extensive motion artifact.  There is continued blood products within the 4th ventricle and 3rd ventricle noting there has been development of layering blood products within the bilateral lateral ventricles posteriorly.  The ventricular system is slightly increased in size since the previous examination, developing hydrocephalus is of concern.  Please note, there are a few punctate foci of high attenuation in the region of the cerebellum bilaterally, this is suspected to reflect that of motion artifact although follow-up is recommended to exclude additional regions of punctate hemorrhage.  Follow-up is advised. 2. There may be trace blood products along the tentorium versus artifact. 3. Sinus disease. Electronically signed by: Jimi Ambriz MD Date:    03/03/2020 Time:    17:25    Ct Head Without Contrast    Result Date: 3/3/2020  Brainstem measuring 3.7 x 2.8 cm.  Intraventricular extension into the 4th ventricle without proximal hydrocephalus.  Also subarachnoid blood products are identified.  No evidence for midline shift or herniation.  Findings consistent with microangiopathy. Results were given  to Dr. Estevez by Dr. Nichols on March 3, 2020 at 13:25 hours.  Two patient identifiers were utilized and read back occurred. Electronically signed by: Rodger Nichols MD Date:    03/03/2020 Time:    13:26      Assessment/Plan:     * Brainstem hemorrhage, nontraumatic  Bart Christy Jr. is a 60 y.o. male with pmhx significant for HTN, DM, CAD and CVA who presents with significant pontine hemorrhage seen on CTH at OSH.    - Patient with poor exam and prognosis. 4T  - No neurosurgical intervention recommended.  - Repeat CTH with re-demonstrated brainstem hemorrhage with blood products into the fourth and third ventricle with some interval enlargement of the ventricles when compared to previous scan  - Na > 140  - HTN: Recommend SBP <140. Pt with  at OSH. On cardene gtt. Continue management per NCC.  - Patient seen with Dr. Atkinson, NSGY resident. Pt's wife (POA), son, and friends at bedside. Dr. Atkinson discussed at length with the family patient's exam and prognosis. Discussed with patient's family there are no surgical interventions to be offered. Discussed option for EVD placement, and explained to the family that while this could stop or slow progression of patient's decline, it most likely would not bring him back to his old high functioning self. Discussed option for medical management as well. Patient's family has elected at this time to pursue maximal medical management, and to allow the next couple of days for family to arrive and see the patient. They have declined EVD placement at this time. Pt is currently DNR, however family discussing possibly reversing. They voiced understanding of the conversation and management options and all of their questions were answered.     Please contact NSGY with any questions or concerns. NSGY will sign off.    Discussed with Dr. Marlin Rios PA-C  Neurosurgery  [facility]

## 2020-03-04 NOTE — ED NOTES
When preparing to move patient to ICU room, cardene noted to be turned off. BP noted to by 160's systolic, will restart cardene and monitor patient before moving

## 2020-03-04 NOTE — PROGRESS NOTES
Pt received from the ED report received via JERMAINE Camargo.  Pt placed on  documented settings.  Will continue to monitor

## 2020-03-04 NOTE — ASSESSMENT & PLAN NOTE
- On Xarelto at home  - Hold sotalol ASA, Plavix D/T ICH  - Continue to reassess restart of home meds

## 2020-03-04 NOTE — ASSESSMENT & PLAN NOTE
- Xarelto as a home med  - Current use of ASA/Plavix  - DDAVP at outside facility  - Platelets given in [facility] ED for reversal

## 2020-03-04 NOTE — NURSING
NSCC contacted. Pt. Pupils fixed bi-laterally per pupillometer.No other neuro changes. NP Gisel made aware. STAT CT ordered.

## 2020-03-04 NOTE — PLAN OF CARE
OT evaluation initiated.  ISI Rubalcava  3/4/2020    Problem: Occupational Therapy Goal  Goal: Occupational Therapy Goal  Description  Goals set 3/4 to be addressed for 14 days with expiration date, 3/18:  Patient will increase functional independence with ADLs by performing:    Patient's family / caregiver will demonstrate independence with providing ROM and changes in bed positioning.   Not met         Outcome: Ongoing, Progressing

## 2020-03-04 NOTE — ASSESSMENT & PLAN NOTE
Vent Mode: PRVC  Oxygen Concentration (%):  [79.8-100] 80.1  Vt Set:  [550 mL-600 mL] 550 mL  PEEP/CPAP:  [8 cmH20] 8 cmH20  Mean Airway Pressure:  [12.8 akO21-26.8 cmH20] 13.7 cmH20  ABG Daily  CXR Daily  Famotidine while on Vent  Chlorhexidine Oral Care while on vent   Propofol for sedation while on vent

## 2020-03-04 NOTE — CONSULTS
[facility]  Vascular Neurology  Comprehensive Stroke Center  Consult Note    Inpatient consult to Vascular (Stroke) Neurology  Consult performed by: Mere Peter MD  Consult ordered by: Mere Peter MD        Assessment/Plan:       * Brainstem hemorrhage, nontraumatic  59 y/o male with CAD (s/p CABG X 5 in 2/18, multivessel PCI after graft failure), HTN, diabetes, HLD, right MCA stroke after a CABG (on DAPT) in 2018 presents as a transfer from [facility] intubated and sedated (on propofol) for a brainstem bleed measuring 3.7X 2.8 cm. Symptoms started today in the cardiology clinic when patient abruptly became incoherent, was diaphoretic and had right sided weakness. He had a preceeding 10/10 headache from Sunday. On arrival to [facility] he was found to be unresponsive. Emergently intubated and sedated on propofol. CTH revealed brainstem bleed measuring 3.7X 2.8 cm. Before transfer here for higher level of care, patient was given 1 U of platelets and ddAVP. On arrival repeat CTH revealed stable hemorrhage however, possible hydrocephalus.  On arrival here, patient was examined on propofol. Spontaneously moving in the left side (both upper and lower extremities) but only withdraws minimally to pain on the right.     Etiology: Most likely hypertensive based on risk factors.     Antithrombotics for secondary stroke prevention: Antiplatelets: None: Hemorrhage any type    Statins for secondary stroke prevention and hyperlipidemia, if present:   Statins: Atorvastatin- 40 mg daily    Aggressive risk factor modification: HTN, DM, HLD     Rehab efforts: The patient has been evaluated by a stroke team provider and the therapy needs have been fully considered based off the presenting complaints and exam findings. The following therapy evaluations are needed: PT evaluate and treat, OT evaluate and treat, SLP evaluate and treat, PM&R evaluate for appropriate placement    Diagnostics ordered/pending: MRI  head without contrast to assess brain parenchyma- As patient cannot get dye without dialysis can get MRI to look at vessels however, this is not an optimal source of imaging     VTE prophylaxis: None: Reason for No Pharmacological VTE Prophylaxis: Mechanical prophylaxis: Place SCDs    BP parameters: ICH: SBP <140        PAF (paroxysmal atrial fibrillation)  History of afib- per chart review has not been on xarelto for the past year    Type 2 diabetes mellitus with stage 3 chronic kidney disease, with long-term current use of insulin  Management per primary team       Essential hypertension  Please keep systolic blood pressure <140         STROKE DOCUMENTATION          NIH Scale:  Reason Unable to Complete: Unable to perform sedation vacation - status epilepticus or ICP uncontrolled  1a. Level of Consciousness: 3-->Responds only with reflex motor or autonomic effects or totally unresponsive, flaccid, and areflexic  1b. LOC Questions: 2-->Answers neither question correctly  1c. LOC Commands: 0-->Performs both tasks correctly  2. Best Gaze: 0-->Normal  3. Visual: 0-->No visual loss  4. Facial Palsy: 0-->Normal symmetrical movements  5a. Motor Arm, Left: 0-->No drift, limb holds 90 (or 45) degrees for full 10 secs  5b. Motor Arm, Right: 0-->No drift, limb holds 90 (or 45) degrees for full 10 secs  6a. Motor Leg, Left: 0-->No drift, leg holds 30 degree position for full 5 secs  6b. Motor Leg, Right: 0-->No drift, leg holds 30 degree position for full 5 secs  7. Limb Ataxia: 0-->Absent  8. Sensory: 0-->Normal, no sensory loss  9. Best Language: 3-->Mute, global aphasia, no usable speech or auditory comprehension  10. Dysarthria: (UN) Intubated or other physical barrier  11. Extinction and Inattention (formerly Neglect): 0-->No abnormality  Total (NIH Stroke Scale): 8    Modified Howard Score: 0  Rogers Coma Scale:15   ABCD2 Score:    [id number] Score:   HAS -BLED Score:   ICH Score:2  Hunt & Main Classification:        Thrombolysis Candidate? No, Recent intracranial hemorrhage     Delays to Thrombolysis?  No    Interventional Revascularization Candidate?   Is the patient eligible for mechanical endovascular reperfusion (LINA)?  No; ICH/ SAH       Hemorrhagic change of an Ischemic Stroke: Does this patient have an ischemic stroke with hemorrhagic changes? No     Subjective:     History of Present Illness:  61 y/o male with a medical history of CAD (s/p CABG X 5 in 2/18, multivessel PCI after graft failure), HTN, diabetes, HLD, right MCA stroke after a CABG (on DAPT) in 2018 presents as a transfer from [facility] intubated and sedated (on propofol) for a brainstem bleed measuring 3.7X 2.8 cm.    Symptoms started today in the Cardiology office.  During examination patient had an acute onset of diaphoresis, right-sided weakness and was also incoherent.  Patient was also complaining of a 10/10 headache since Sunday. Upon arrival to [facility] patient was unresponsive where he was emergently intubated, placed on propofol and transferred here for higher level of care. Before transfer, patient was given 1 unit of platelets as well as 0.03 mcg/kg DDVAP. Repeat CTH at [facility] shows stable hemorrhage however, possible hydrocephalus.  On arrival here, patient was examined on propofol. Spontaneously moving in the left side (both upper and lower extremities) but withdraws to pain on the right.     Please note, wife has mentioned that when patient needs any type of dye for imaging, he needs to get emergently dialyzed.         Past Medical History:   Diagnosis Date    Anticoagulant long-term use     Diabetes mellitus, type 2     Encounter for blood transfusion     Fe deficiency anemia 7/2/2019    Lab 12/12/18    Gastritis 06/12/2019    Polyp of ascending colon 06/12/2019    Polyp of transverse colon 06/12/2019    Sigmoid polyp 06/12/2019    Sleep apnea with use of continuous positive airway pressure (CPAP)     Stage 3  chronic kidney disease     Weakness of left arm      Past Surgical History:   Procedure Laterality Date    ATHERECTOMY  10/22/2018    Procedure: Atherectomy;  Surgeon: Taya rAellano MD;  Location: [facility] CATH;  Service: Cardiology;;    CARDIAC SURGERY      cardiac window      COLONOSCOPY  06/12/2019    per dr saravia   did not say when to repeat     CORONARY ANGIOGRAPHY Left 10/22/2018    Procedure: ANGIOGRAM, CORONARY ARTERY;  Surgeon: Taya Arellano MD;  Location: [facility] CATH;  Service: Cardiology;  Laterality: Left;    CORONARY ANGIOGRAPHY INCLUDING BYPASS GRAFTS WITH CATHETERIZATION OF LEFT HEART N/A 10/22/2018    Procedure: ANGIOGRAM, CORONARY, INCLUDING BYPASS GRAFT, WITH LEFT HEART CATHETERIZATION;  Surgeon: Taya Arellano MD;  Location: [facility] CATH;  Service: Cardiology;  Laterality: N/A;    CORONARY ANGIOGRAPHY INCLUDING BYPASS GRAFTS WITH CATHETERIZATION OF LEFT HEART N/A 1/28/2020    Procedure: ANGIOGRAM, CORONARY, INCLUDING BYPASS GRAFT, WITH LEFT HEART CATHETERIZATION;  Surgeon: Taya Arellano MD;  Location: [facility] CATH;  Service: Cardiology;  Laterality: N/A;    CORONARY ARTERY BYPASS GRAFT  02/2018    x 5 vessel per Dr. Chou     CORONARY STENT PLACEMENT N/A 10/22/2018    Procedure: INSERTION, STENT, CORONARY ARTERY;  Surgeon: Taya Arellano MD;  Location: [facility] CATH;  Service: Cardiology;  Laterality: N/A;    CORONARY STENT PLACEMENT Right 12/4/2018    Procedure: INSERTION, STENT, CORONARY ARTERY;  Surgeon: Taya Arellano MD;  Location: [facility] CATH;  Service: Cardiology;  Laterality: Right;    ESOPHAGOGASTRODUODENOSCOPY  06/12/2019    per dr saravia  did not say when to repeat    INSERTION OF DIALYSIS CATHETER  10/22/2018    Procedure: Insertion, Catheter, Dialysis;  Surgeon: Taya Arellano MD;  Location: [facility] CATH;  Service: Cardiology;;    LEFT HEART CATHETERIZATION Left 1/28/2020    Procedure: Left heart cath;  Surgeon: Taya Arellano MD;  Location: [facility] CATH;  Service: Cardiology;  Laterality: Left;     Removal of dialysis catheter      REPEAT CLOSURE OF STERNAL INCISION N/A 8/21/2019    Procedure: CLOSURE, STERNAL INCISION, REPEAT with sternal plating;  Surgeon: Bob Chou MD;  Location: [facility] OR;  Service: Cardiovascular;  Laterality: N/A;     Family History   Problem Relation Age of Onset    Diabetes Mother     Hyperlipidemia Mother     Hypertension Mother     Heart disease Mother         60s had CABG    Colon cancer Father      Social History     Tobacco Use    Smoking status: Former Smoker     Types: Cigars    Smokeless tobacco: Never Used    Tobacco comment: occasional cigar    Substance Use Topics    Alcohol use: Yes     Alcohol/week: 2.0 standard drinks     Types: 1 Cans of beer, 1 Shots of liquor per week     Comment: occasional    Drug use: No     Review of patient's allergies indicates:  No Known Allergies    Medications: I have reviewed the current medication administration record.      (Not in a hospital admission)    Review of Systems   Unable to perform ROS: Intubated   Constitutional: Negative for chills, diaphoresis, fatigue and fever.   Respiratory: Negative for cough, choking and shortness of breath.    Cardiovascular: Negative for chest pain and leg swelling.   Gastrointestinal: Negative for vomiting.   Musculoskeletal: Positive for gait problem. Negative for neck pain and neck stiffness.   Skin: Negative for color change, pallor and rash.   Neurological: Positive for speech difficulty and weakness. Negative for dizziness.     Objective:     Vital Signs (Most Recent):  Temp: 98.9 °F (37.2 °C) (03/03/20 1610)  Pulse: 67 (03/03/20 1659)  Resp: 13 (03/03/20 1659)  BP: (!) 110/56 (03/03/20 1702)  SpO2: 100 % (03/03/20 1659)    Vital Signs Range (Last 24H):  Temp:  [97.8 °F (36.6 °C)-98.9 °F (37.2 °C)]   Pulse:  [67-85]   Resp:  [13-18]   BP: (110-269)/()   SpO2:  [93 %-100 %]     Physical Exam   HENT:   Head: Normocephalic and atraumatic.   Neurological: GCS eye subscore is  1. GCS verbal subscore is 1. GCS motor subscore is 1. He displays no Babinski's sign on the right side. He displays no Babinski's sign on the left side.   Examination done on propofol   -Spontaneously moves left extremities  -Withdraws to pain on the right   -Hyporeflexic  -Pinpoint pupils 2/2 sedation            Laboratory:  CMP:   Recent Labs   Lab 03/03/20  1241   CALCIUM 9.1   ALBUMIN 4.7   PROT 7.9      K 3.7   CO2 27      BUN 31*   CREATININE 1.95*   ALKPHOS 127   ALT 36   AST 26   BILITOT 0.4     CBC:   Recent Labs   Lab 03/03/20  1241   WBC 10.90   RBC 5.21   HGB 15.9   HCT 47.8      MCV 92   MCH 30.5   MCHC 33.3     Lipid Panel:   Recent Labs   Lab 03/03/20  1639   CHOL 139   LDLCALC 77.4   HDL 35*   TRIG 133     Coagulation:   Recent Labs   Lab 03/03/20  1241   INR 1.0   APTT 30.2       Diagnostic Results:      Brain imaging:  3/3/20 CTH 1659    -Tthe hemorrhagic focus appears grossly stable as compared to the most recent examination allowing for extensive motion artifact.    -There is continued blood products within the 4th ventricle and 3rd ventricle noting there has been development of layering blood products within the bilateral lateral ventricles posteriorly.    -The ventricular system is slightly increased in size since the previous examination, developing hydrocephalus is of concern.    3/3/20 CTH 1326    Brainstem measuring 3.7 x 2.8 cm.  Intraventricular extension into the 4th ventricle without proximal hydrocephalus.   Vessel Imaging:  None     Cardiac Evaluation:         Mere Peter MD  Comprehensive Stroke Center  Department of Vascular Neurology   [facility]

## 2020-03-04 NOTE — ASSESSMENT & PLAN NOTE
Bart Christy Jr. is a 60 y.o. male with pmhx significant for HTN, DM, CAD and CVA who presents with significant pontine hemorrhage seen on CTH at OSH.    - Patient with poor exam and prognosis. 4T  - No neurosurgical intervention recommended.  - Repeat CTH with re-demonstrated brainstem hemorrhage with blood products into the fourth and third ventricle with some interval enlargement of the ventricles when compared to previous scan  - Na > 140  - HTN: Recommend SBP <140. Pt with  at OSH. On cardene gtt. Continue management per NCC.  - Patient seen with Dr. Atkinson, NSGY resident. Pt's wife (POA), son, and friends at bedside. Dr. Atkinson discussed at length with the family patient's exam and prognosis. Discussed with patient's family there are no surgical interventions to be offered. Discussed option for EVD placement, and explained to the family that while this could stop or slow progression of patient's decline, it most likely would not bring him back to his old high functioning self. Discussed option for medical management as well. Patient's family has elected at this time to pursue maximal medical management, and to allow the next couple of days for family to arrive and see the patient. They have declined EVD placement at this time. Pt is currently DNR, however family discussing possibly reversing. They voiced understanding of the conversation and management options and all of their questions were answered.     Please contact NSGY with any questions or concerns    Discussed with Dr. Isaac

## 2020-03-04 NOTE — PLAN OF CARE
POC reviewed with pt at 0500. Pt. Unable to verbalize understanding. CT scan overnight. Pt. Pupils became fixed bilaterally. Cardene, NS, Propofol gtt continued. X1 dose of D10 for BG 64. Tylenol x1 for low grade temp. Phos 5.3 and Cr. 2.7 elevated. Questions and concerns addressed. No acute events overnight. Pt progressing toward goals. Will continue to monitor. See flowsheets for full assessment and VS info

## 2020-03-04 NOTE — PROGRESS NOTES
[facility]  Neurocritical Care  Progress Note    Admit Date: 3/3/2020  Service Date: 03/04/2020  Length of Stay: 1    Subjective:     Chief Complaint: Brainstem hemorrhage, nontraumatic    History of Present Illness: The patient is a 60-year-old male with a PMH of HTN, DMII, CAD, and prior CVA with some left-sided weakness transferred from [facility] for evaluation and management of an ICH.  Patient was at the cardiology office today when he acutely experienced right-sided weakness.   He underwent cardiac angiogram last month and was following up with his Cardiologist today. During visit  he became acutely weak on the right side with slurred speech and facial asymmetry. EMS was called and on arrival to ED he was minimally responsive with sonorous respirations. He was intubated for airway protection and CT head was obtained which revealed Brainstem measuring 3.7 x 2.8 cm.  Intraventricular extension into the 4th ventricle without proximal hydrocephalus.  Also subarachnoid blood products were identified. According to the wife,  the patient reportedly had a recurrent headache onset since yesterday.  No reported chest pain or shortness of breath. Of note, the patient has a history of blood thinner and antiplatelet. DDAVP given prior to arrival. SBP was elevated > 200 mm Hg, cardene initiated. Decision to transfer to [facility] for further neurological care. The patient is admitted to [facility] for higher level of      Hospital Course: 03/04/2020 Intubated, on cardene and on propofol. Refused EVD at this time and would prefer maximum medical management. Family aware of poor prognosis, awaiting arrival of other family members at this time that are out of the [place].         Review of Systems   Unable to perform ROS: Intubated     Objective:     Vitals:  Temp: 98.8 °F (37.1 °C)  Pulse: 74  Rhythm: normal sinus rhythm  BP: (!) 127/58  MAP (mmHg): 84  Resp: 14  SpO2: 100 %  Oxygen Concentration (%): 55  O2 Device  (Oxygen Therapy): ventilator  Vent Mode: A/C  Set Rate: 12 BPM  Vt Set: 500 mL  PEEP/CPAP: 8 cmH20  Peak Airway Pressure: 24 cmH2O  Mean Airway Pressure: 12 cmH20  Plateau Pressure: 0 cmH20    Temp  Min: 97.8 °F (36.6 °C)  Max: 99.1 °F (37.3 °C)  Pulse  Min: 62  Max: 85  BP  Min: 103/52  Max: 269/123  MAP (mmHg)  Min: 74  Max: 163  Resp  Min: 8  Max: 70  SpO2  Min: 93 %  Max: 100 %  Oxygen Concentration (%)  Min: 55  Max: 100    03/03 0701 - 03/04 0700  In: 3179.6 [I.V.:2873.2]  Out: 650 [Urine:650]   Unmeasured Output  Stool Occurrence: 0       Physical Exam  Constitutional: He appears well-developed and well-nourished. He is intubated.   HENT:   Head: Normocephalic and atraumatic.   Eyes: Pupils are equal, round, and reactive to light. EOM are normal.   Neck: Normal range of motion. Neck supple.   Cardiovascular:   Afib history   Pulmonary/Chest: He is intubated.   Abdominal: Soft. Bowel sounds are normal.   Musculoskeletal: Normal range of motion.   Neurological:   Mental Status: eyes not open to stimulus, on propofol   Intubated  Pupils  2mm, Pinpoint.   Weak gag, cough   Myoclonic LS movement  Triple flex on R side    Medications:  Continuous  sodium chloride 0.9% Last Rate: 125 mL/hr at 03/04/20 1110   niCARdipine Last Rate: 10 mg/hr (03/04/20 1108)   propofol Last Rate: 30 mcg/kg/min (03/04/20 1109)   Scheduled  chlorhexidine 15 mL BID   famotidine 20 mg BID   polyethylene glycol 17 g Daily   senna-docusate 8.6-50 mg 1 tablet BID   PRN  sodium chloride  Q24H PRN   acetaminophen 650 mg Q6H PRN   Dextrose 10% Bolus 12.5 g PRN   Dextrose 10% Bolus 25 g PRN   glucagon (human recombinant) 1 mg PRN   insulin aspart U-100 1-10 Units Q6H PRN   ondansetron 4 mg Q8H PRN   sodium chloride 0.9% 10 mL PRN     Today I personally reviewed pertinent medications, lines/drains/airways, imaging, cardiology results, laboratory results, microbiology results,    Diet  Diet NPO  Diet NPO    Assessment/Plan:     Neuro  * Brainstem  hemorrhage, nontraumatic  Patient is currently Partial Code as he is already intubated. Lengthy discussion w/ staff this AM regarding prognosis and medical management, as family chose to decline EVD placement that was offered by NSGY.  Poor prognosis given the location of the hemorrhage, extension as well as poor baseline condition w/ multiple comorbidities.   Pending family arrival from out of the country, but at this time patient will likely undergo WOC.     - NSGY following  - Vascular Neurology following  - Neurological checks q1h  - Seizure, fall, aspiration precautions  - Head of bed at 30 degrees at all times  - SBP goal 100-140  - CTH:hemorrhagic focus appears grossly stable. continued blood products within the 4th ventricle and 3rd ventricle noting there has been development of layering blood products within the bilateral lateral ventricles posteriorly.  The ventricular system is slightly increased in size, developing hydrocephalus is of concern.     - PT/OT/ST consults initiated, although on hold due to patients critical condition and poor prognosis   - Aspiration precautions, head of bed above 30 degrees  - Baseline CXR  - Continuous cardiac telemetry  - Nicardine drip (target SBP < 140)  - Monitor daily CBC,CMP, Mg, Phos  - IVFs NS @ 125  - NPO  - NGT/OGT in place   - FSBS q6hr while NPO/TF  - Check HgbA1c, TSH  - Insulin SS  - Hypoglycemia protocol  - SCDs for prophylaxis;   Consults: Neurosurgery  Vascular Neurology  Physical therapy  Occupational therapy  Speech therapy  Nutrition  Case Management  Social Work      Brain compression  - Neuro Checks Q1 hr  - Interval Imaging with change in LOC    IVH (intraventricular hemorrhage)  - See brainstem hemorrhage  - NSGY signed off, as family declined surgical intervention at this time        Pulmonary  Acute respiratory failure with hypoxia and hypercapnia  Vent Mode: A/C  Oxygen Concentration (%):  [] 55  Resp Rate Total:  [12 br/min-17 br/min] 13  br/min  Vt Set:  [500 mL-600 mL] 500 mL  PEEP/CPAP:  [8 cmH20] 8 cmH20  Mean Airway Pressure:  [11 zqY08-32.8 cmH20] 12 cmH20  ABG Daily  CXR Daily  Famotidine while on Vent  Chlorhexidine Oral Care while on vent   Propofol for sedation while on vent     Cardiac/Vascular  PAF (paroxysmal atrial fibrillation)  - On Xarelto at home  - Hold sotalol ASA, Plavix D/T ICH  - Continue to reassess restart of home meds    Essential hypertension  Continuous Cardiac Monitoring  Vital Signs Q1 hour  Systolic (24hrs), Av , Min:103 , Max:269   CXR establish baseline; R/O cardiomegaly  ECHO to assess left ventricular (LV) mass, systolic and diastolic function,  assess left atrial size and function.   EKG to determine baseline rhythm; identify abnormal arrhythmias  Cardene Gtt to Maintain SBP < 140            Hematology  Antiplatelet or antithrombotic long-term use  - Xarelto as a home med  - Current use of ASA/Plavix  - DDAVP at outside facility  - Platelets given in [facility] for reversal     Endocrine  Type 2 diabetes mellitus with stage 3 chronic kidney disease, with long-term current use of insulin  - A1c 6.6  - RISS   - POCT Q6  - Nutritional Consult for dietary /caloric needs            The patient is being Prophylaxed for:  Venous Thromboembolism with: Mechanical  Stress Ulcer with: H2B  Ventilator Pneumonia with: chlorhexidine oral care    Activity Orders          Diet NPO: NPO starting at 1629    Commode at bedside starting at 1629        Partial Code    Yani Smith MD  Neurocritical Care  [facility]

## 2020-03-04 NOTE — ASSESSMENT & PLAN NOTE
Continuous Cardiac Monitoring  Vital Signs Q1 hour  Systolic (24hrs), Av , Min:103 , Max:269   CXR establish baseline; R/O cardiomegaly  ECHO to assess left ventricular (LV) mass, systolic and diastolic function,  assess left atrial size and function.   EKG to determine baseline rhythm; identify abnormal arrhythmias  Cardene Gtt to Maintain SBP < 140

## 2020-03-04 NOTE — ASSESSMENT & PLAN NOTE
NSGY following  Vascular Neurology following  - Neurological checks q1h  - Seizure, fall, aspiration precautions  - Head of bed at 30 degrees at all times  - SBP goal 100-140  - CTH:hemorrhagic focus appears grossly stable. continued blood products within the 4th ventricle and 3rd ventricle noting there has been development of layering blood products within the bilateral lateral ventricles posteriorly.  The ventricular system is slightly increased in size, developing hydrocephalus is of concern.     - PT/OT/ST consults initiated  - Aspiration precautions, head of bed above 30 degrees  - Baseline CXR  - Continuous cardiac telemetry  -Nicardine drip (target SBP < 140)  - Monitor daily CBC,CMP, Mg, Phos  - IVFs NS @ 125  - NPO  - Place NGT/OGT  - FSBS q6hr while NPO/TF  - Check HgbA1c, TSH  - Insulin SS  - Hypoglycemia protocol  - SCDs for prophylaxis;   Consults: Neurosurgery  Vascular Neurology  Physical therapy  Occupational therapy  Speech therapy  Nutrition  Case Management  Social Work

## 2020-03-04 NOTE — ASSESSMENT & PLAN NOTE
Patient is currently Partial Code as he is already intubated. Lengthy discussion w/ staff this AM regarding prognosis and medical management, as family chose to decline EVD placement that was offered by NSGY.  Poor prognosis given the location of the hemorrhage, extension as well as poor baseline condition w/ multiple comorbidities.   Pending family arrival from out of the country, but at this time patient will likely undergo WOC.     - NSGY following  - Vascular Neurology following  - Neurological checks q1h  - Seizure, fall, aspiration precautions  - Head of bed at 30 degrees at all times  - SBP goal 100-140  - CTH:hemorrhagic focus appears grossly stable. continued blood products within the 4th ventricle and 3rd ventricle noting there has been development of layering blood products within the bilateral lateral ventricles posteriorly.  The ventricular system is slightly increased in size, developing hydrocephalus is of concern.     - PT/OT/ST consults initiated, although on hold due to patients critical condition and poor prognosis   - Aspiration precautions, head of bed above 30 degrees  - Baseline CXR  - Continuous cardiac telemetry  - Nicardine drip (target SBP < 140)  - Monitor daily CBC,CMP, Mg, Phos  - IVFs NS @ 125  - NPO  - NGT/OGT in place   - FSBS q6hr while NPO/TF  - Check HgbA1c, TSH  - Insulin SS  - Hypoglycemia protocol  - SCDs for prophylaxis;   Consults: Neurosurgery  Vascular Neurology  Physical therapy  Occupational therapy  Speech therapy  Nutrition  Case Management  Social Work

## 2020-03-04 NOTE — PT/OT/SLP EVAL
"Occupational Therapy   Evaluation    Name: Bart Christy Jr.  MRN: [id number]  Admitting Diagnosis:  Brainstem hemorrhage, nontraumatic      Recommendations:     Discharge Recommendations: (pending medical status)  Discharge Equipment Recommendations:  (pending medical status)  Barriers to discharge:  (medical status)    Assessment:     Bart Christy Jr. is a 60 y.o. male with a medical diagnosis of Brainstem hemorrhage, nontraumatic.  He presents with performance deficits affecting function: weakness, impaired self care skills, impaired balance, decreased coordination, decreased safety awareness, decreased ROM, impaired coordination, decreased upper extremity function, impaired functional mobilty, visual deficits, impaired endurance, impaired sensation, gait instability, impaired cognition, decreased lower extremity function, abnormal tone, impaired fine motor, impaired cardiopulmonary response to activity.      Rehab Prognosis: Good; patient would benefit from acute skilled OT services to address these deficits and reach maximum level of function.       Plan:     Patient to be seen 2 x/week to address the above listed problems via self-care/home management, cognitive retraining, neuromuscular re-education, therapeutic activities, therapeutic exercises, sensory integration  · Plan of Care Expires: 04/01/20  · Plan of Care Reviewed with: patient    Subjective     Patient:  "We were at the cardiology office following up with an angiogram and he got weak on the right side.  He started sweating and crying and he couldn't grasp with his right hand and then he couldn't move his right side."     Occupational Profile:  Patient resides with wife in [place] in one story home with one step to enter.  PTA patient independent with ADLs including driving.  Patient is right handed.  Unemployed. Hobbies: reading, keeping up with the news.    Pain/Comfort:  · Pain Rating 1: 0/10  · Pain Rating Post-Intervention 1: " 0/10    Patients cultural, spiritual, Sikh conflicts given the current situation: no    Objective:     Communicated with: Nurse prior to session.  Patient found supine with arterial line, bed alarm, ventilator, SCD, telemetry, pulse ox (continuous), peripheral IV upon OT entry to room.    General Precautions: Standard, aspiration, fall, NPO, seizure   Orthopedic Precautions:N/A   Braces: N/A     Occupational Performance:    Bed Mobility:    · Patient completed Rolling/Turning to Left with  dependent  · Patient completed Rolling/Turning to Right with dependent    Functional Mobility/Transfers:  · Dependent drawsheet    Activities of Daily Living:  · Feeding:  NPO    · Grooming: total assistance while supine    Cognitive/Visual Perceptual:  Cognitive/Psychosocial Skills:     -       Oriented to: Daily orientation provided   -       Follows Commands/attention:Unable to follow commands  -       Communication: Nonverbal; intubated    Physical Exam:  Postural examination/scapula alignment:    -       Rounded shoulders  Skin integrity: Visible skin intact  Edema:  Mild bilateral UEs  Upper Extremity Range of Motion:  PROM WNL    AMPAC 6 Click ADL:  AMPAC Total Score: 6    Treatment & Education:  Patient education provided on role of OT.  Daily orientation provided.  PROM performed bilateral UE/LEs one set x 5 rep in all planes of motion with stretches provided at end range; sustained stretch provided for ankle dorsiflexion.  Assistance and facilitation provided for upward rotation of the scapula during shoulder flexion and abduction to promote orientation of glenoid fossa of scapula to humeral head for prevention of post-stroke hemiplegic pain.  Patient unable to follow commands.  Family present during the session.  Continued education, patient/ family training recommended.    White board updated in patient's room.  OT asked if there were any other questions; family had no further questions.   Education:    Patient  left supine with all lines intact, call button in reach and bed alarm on    GOALS:   Multidisciplinary Problems     Occupational Therapy Goals        Problem: Occupational Therapy Goal    Goal Priority Disciplines Outcome Interventions   Occupational Therapy Goal     OT, PT/OT Ongoing, Progressing    Description:  Goals set 3/4 to be addressed for 14 days with expiration date, 3/18:  Patient will increase functional independence with ADLs by performing:    Patient's family / caregiver will demonstrate independence with providing ROM and changes in bed positioning.   Not met                          History:     Past Medical History:   Diagnosis Date    Anticoagulant long-term use     Brainstem hemorrhage, nontraumatic 3/3/2020    Diabetes mellitus, type 2     Encounter for blood transfusion     Fe deficiency anemia 7/2/2019    Lab 12/12/18    Gastritis 06/12/2019    Polyp of ascending colon 06/12/2019    Polyp of transverse colon 06/12/2019    Sigmoid polyp 06/12/2019    Sleep apnea with use of continuous positive airway pressure (CPAP)     Stage 3 chronic kidney disease     Weakness of left arm        Past Surgical History:   Procedure Laterality Date    ATHERECTOMY  10/22/2018    Procedure: Atherectomy;  Surgeon: Taya Arellano MD;  Location: [facility] CATH;  Service: Cardiology;;    CARDIAC SURGERY      cardiac window      COLONOSCOPY  06/12/2019    per dr saravia   did not say when to repeat     CORONARY ANGIOGRAPHY Left 10/22/2018    Procedure: ANGIOGRAM, CORONARY ARTERY;  Surgeon: Taya Arellano MD;  [facility];  Service: Cardiology;  Laterality: Left;    CORONARY ANGIOGRAPHY INCLUDING BYPASS GRAFTS WITH CATHETERIZATION OF LEFT HEART N/A 10/22/2018    Procedure: ANGIOGRAM, CORONARY, INCLUDING BYPASS GRAFT, WITH LEFT HEART CATHETERIZATION;  Surgeon: Taya Arellano MD;  [facility];  Service: Cardiology;  Laterality: N/A;    CORONARY ANGIOGRAPHY INCLUDING BYPASS GRAFTS WITH CATHETERIZATION  OF LEFT HEART N/A 1/28/2020    Procedure: ANGIOGRAM, CORONARY, INCLUDING BYPASS GRAFT, WITH LEFT HEART CATHETERIZATION;  Surgeon: Taya Arellano MD;  Location: [facility] CATH;  Service: Cardiology;  Laterality: N/A;    CORONARY ARTERY BYPASS GRAFT  02/2018    x 5 vessel per Dr. Chou     CORONARY STENT PLACEMENT N/A 10/22/2018    Procedure: INSERTION, STENT, CORONARY ARTERY;  Surgeon: Taya Arellano MD;  Location: [facility] CATH;  Service: Cardiology;  Laterality: N/A;    CORONARY STENT PLACEMENT Right 12/4/2018    Procedure: INSERTION, STENT, CORONARY ARTERY;  Surgeon: Taya Arellano MD;  Location: [facility] CATH;  Service: Cardiology;  Laterality: Right;    ESOPHAGOGASTRODUODENOSCOPY  06/12/2019    per dr saravia  did not say when to repeat    INSERTION OF DIALYSIS CATHETER  10/22/2018    Procedure: Insertion, Catheter, Dialysis;  Surgeon: Taya Arellano MD;  Location: [facility] CATH;  Service: Cardiology;;    LEFT HEART CATHETERIZATION Left 1/28/2020    Procedure: Left heart cath;  Surgeon: Taya Arellano MD;  Location: [facility] CATH;  Service: Cardiology;  Laterality: Left;    Removal of dialysis catheter      REPEAT CLOSURE OF STERNAL INCISION N/A 8/21/2019    Procedure: CLOSURE, STERNAL INCISION, REPEAT with sternal plating;  Surgeon: Bob Chou MD;  Location: [facility];  Service: Cardiovascular;  Laterality: N/A;       Time Tracking:     OT Date of Treatment: 03/04/20  OT Start Time: 0506  OT Stop Time: 0516  OT Total Time (min): 10 min    Billable Minutes:Evaluation 10    ISI Rubalcava  3/4/2020

## 2020-03-04 NOTE — PROGRESS NOTES
"[facility]  Adult Nutrition  Progress Note    SUMMARY       Recommendations    1. If unable to extubate > 48hrs and propofol continues, recommend TF Peptamen Intense VHP @ 55mL/hr x 24 hrs to provide 1320kcal, 124g protein, and 1009mL free water. TF + propofol meets 105% of EEN and 108% of EPN.   2. If unable to extubate > 48hrs and propofol is discontinued, recommend Impact Peptide 1.5 @ 55mL/hr x 24 hrs to provide 1980kcal, 124g protein, and 1016mL free water. Meets 96% of EEN and 82% of EPN.   3. If able to extubate, ADAT to Diabetic/Cardiac with texture per SLP recommendations.   Goals: Pt to recieve nutrition by RD follow-up.  Nutrition Goal Status: new    Reason for Assessment    Reason For Assessment: consult  Diagnosis: hemorrhage  Relevant Medical History: HTN, DM2, CAD, CVH  Interdisciplinary Rounds: did not attend  General Information Comments: Pt intubated, sedated with family at bedside. Propofol infusing @ 31.4 mL/hr. Per chart review, pt with stable wts x 1 year. NFPE completed 3/4. Pt well-nourished, obese, does not meet criteria for malnutrition at this time.  Nutrition Discharge Planning: Unable to determine at this time.    Nutrition Risk Screen    Nutrition Risk Screen: dysphagia or difficulty swallowing    Nutrition/Diet History    Spiritual, Cultural Beliefs, Lutheran Practices, Values that Affect Care: no  Factors Affecting Nutritional Intake: NPO, on mechanical ventilation    Anthropometrics    Temp: 99.1 °F (37.3 °C)  Height Method: Stated  Height: 5' 10" (177.8 cm)  Height (inches): 70 in  Weight Method: Bed Scale  Weight: 135.7 kg (299 lb 2.6 oz)  Weight (lb): 299.17 lb  Ideal Body Weight (IBW), Male: 166 lb  % Ideal Body Weight, Male (lb): 180.22 %  BMI (Calculated): 42.9  BMI Grade: greater than 40 - morbid obesity       Lab/Procedures/Meds    Pertinent Labs Reviewed: reviewed  Pertinent Labs Comments: BUN 37, Cr 2.7, eGFR 24.9, P 5.3, HgbA1C 6.6  Pertinent " Medications Reviewed: reviewed  Pertinent Medications Comments: IVF, propofol, senna-docusate, polyethylene glycol, nicardipine, famotidine           Estimated/Assessed Needs    Weight Used For Calorie Calculations: 135.7 kg (299 lb 2.6 oz)  Energy Calorie Requirements (kcal): 2056  Energy Need Method: [place] (modified)  Protein Requirements: 151g/day(2.0 g/kg IBW)  Weight Used For Protein Calculations: 75.5 kg (166 lb 7.2 oz)  Fluid Requirements (mL): 1mL/kcal or per MD  Estimated Fluid Requirement Method: RDA Method  RDA Method (mL): 2056  CHO Requirement: 257g/day      Nutrition Prescription Ordered    Current Diet Order: NPO    Evaluation of Received Nutrient/Fluid Intake    Lipid Calories (kcals): 829 kcals(propofol @ 31.4mL/hr)  I/O: +2.5L since admit  Comments: LBM 3/2  % Intake of Estimated Energy Needs: 25-50%  % Meal Intake: 25-50%    Nutrition Risk    Level of Risk/Frequency of Follow-up: high(2x/wk)     Assessment and Plan  Nutrition Problem  Inadequate energy intake    Related to (etiology):   NPO, no nutrition at this time    Signs and Symptoms (as evidenced by):   Pt receiving/consuming <50% of EEN/EPN.    Interventions/Recommendations (treatment strategy):  Collaboration of care with providers  Enteral Nutrition - TF Peptamen Intense VHP @ 55mL/hr x 24 hrs to provide 1320kcal, 124g protein, and 1009mL free water.  Modified Diet- Diabetic, Cardiac    Nutrition Diagnosis Status:   New      Monitor and Evaluation    Food and Nutrient Intake: energy intake, food and beverage intake, enteral nutrition intake, parenteral nutrition intake  Food and Nutrient Adminstration: diet order, enteral and parenteral nutrition administration  Knowledge/Beliefs/Attitudes: food and nutrition knowledge/skill  Physical Activity and Function: nutrition-related ADLs and IADLs  Anthropometric Measurements: weight, weight change, body mass index  Biochemical Data, Medical Tests and Procedures: electrolyte and renal panel,  gastrointestinal profile, glucose/endocrine profile, inflammatory profile, lipid profile  Nutrition-Focused Physical Findings: overall appearance     Malnutrition Assessment                 Orbital Region (Subcutaneous Fat Loss): well nourished  Upper Arm Region (Subcutaneous Fat Loss): well nourished   [place] Region (Muscle Loss): well nourished  Clavicle Bone Region (Muscle Loss): well nourished  Clavicle and Acromion Bone Region (Muscle Loss): well nourished  Dorsal Hand (Muscle Loss): well nourished  Patellar Region (Muscle Loss): well nourished  Anterior Thigh Region (Muscle Loss): well nourished  Posterior Calf Region (Muscle Loss): well nourished                 Nutrition Follow-Up    RD Follow-up?: Yes

## 2020-03-04 NOTE — PLAN OF CARE
CM informed by team that patient has poor prognosis and will likely be a withdrawal for care when family all arrive.  Patient intubated on vent and unable to answer questions.  Discharge Planning Assessment not appropriate with family at this time.  Assessment per chart.  Per chart, the patient lives with his wife in a single story house with 1 step(s) to enter.   Per chart, the patient was independent with ADLS and used no dme for ambulation.  CM will follow for needs.       03/04/20 1513   Discharge Assessment   Assessment Type Discharge Planning Assessment   Confirmed/corrected address and phone number on facesheet? Yes   Assessment information obtained from? Medical Record   Expected Length of Stay (days) 3   Prior to hospitilization cognitive status: Alert/Oriented   Prior to hospitalization functional status: Independent   Current cognitive status: Coma/Sedated/Intubated   Current Functional Status: Completely Dependent   Lives With spouse   Able to Return to Prior Arrangements no   Is patient able to care for self after discharge? No   Who are your caregiver(s) and their phone number(s)? Tracie Christy (wife) [phone]   Patient's perception of discharge disposition other (comments)  (chris)   Readmission Within the Last 30 Days no previous admission in last 30 days   Patient currently being followed by outpatient case management? Unable to determine (comments)   Patient currently receives any other outside agency services? No   Equipment Currently Used at Home none   Do you have any problems affording any of your prescribed medications? No   Does the patient have transportation home? Yes   Transportation Anticipated family or friend will provide   Does the patient receive services at the [facility]? No   Discharge Plan A Other  (Per MD, poor prognosis.  Awaiting family arrival for likely withdrawal of care)   Discharge Plan B Other  (tbd)   DME Needed Upon Discharge  none   Patient/Family in Agreement  with Plan unable to assess                PCP:  OSWALDO Orellana Jr, MD        Pharmacy:    [id number] - [place], [place] - [address]  [place] [address]  Phone: [phone] Fax: [phone]    [facility] - [place], [place] - [facility]  [address] [facility] [address]  Phone: [phone] Fax: [phone]        Emergency Contacts:  Extended Emergency Contact Information  Primary Emergency Contact: Tracie Christy  Address: [address] [place] [address] [facility]  Home Phone: [phone]  Mobile Phone: [phone]  Relation: Spouse      Insurance:    Payor: BLUE CROSS BLUE SHIELD / Plan: BCBS ALL OUT OF STATE / Product Type: PPO /       Luz Bacon RN, CCRN-K, CCM  Neuro-Critical Care   X [phone]    03/04/2020  3:15 PM

## 2020-03-04 NOTE — ASSESSMENT & PLAN NOTE
Currently on Xarelto  Current use of ASA/Plavix  DDAVP at outside facility  Platelets given in [facility] ED

## 2020-03-04 NOTE — SUBJECTIVE & OBJECTIVE
Review of Systems   Unable to perform ROS: Intubated     Objective:     Vitals:  Temp: 98.8 °F (37.1 °C)  Pulse: 74  Rhythm: normal sinus rhythm  BP: (!) 127/58  MAP (mmHg): 84  Resp: 14  SpO2: 100 %  Oxygen Concentration (%): 55  O2 Device (Oxygen Therapy): ventilator  Vent Mode: A/C  Set Rate: 12 BPM  Vt Set: 500 mL  PEEP/CPAP: 8 cmH20  Peak Airway Pressure: 24 cmH2O  Mean Airway Pressure: 12 cmH20  Plateau Pressure: 0 cmH20    Temp  Min: 97.8 °F (36.6 °C)  Max: 99.1 °F (37.3 °C)  Pulse  Min: 62  Max: 85  BP  Min: 103/52  Max: 269/123  MAP (mmHg)  Min: 74  Max: 163  Resp  Min: 8  Max: 70  SpO2  Min: 93 %  Max: 100 %  Oxygen Concentration (%)  Min: 55  Max: 100    03/03 0701 - 03/04 0700  In: 3179.6 [I.V.:2873.2]  Out: 650 [Urine:650]   Unmeasured Output  Stool Occurrence: 0       Physical Exam  Constitutional: He appears well-developed and well-nourished. He is intubated.   HENT:   Head: Normocephalic and atraumatic.   Eyes: Pupils are equal, round, and reactive to light. EOM are normal.   Neck: Normal range of motion. Neck supple.   Cardiovascular:   Afib history   Pulmonary/Chest: He is intubated.   Abdominal: Soft. Bowel sounds are normal.   Musculoskeletal: Normal range of motion.   Neurological:   Mental Status: eyes not open to stimulus, on propofol   Intubated  Pupils  2mm, Pinpoint.   Weak gag, cough   Myoclonic LS movement  Triple flex on R side    Medications:  Continuous  sodium chloride 0.9% Last Rate: 125 mL/hr at 03/04/20 1110   niCARdipine Last Rate: 10 mg/hr (03/04/20 1108)   propofol Last Rate: 30 mcg/kg/min (03/04/20 1109)   Scheduled  chlorhexidine 15 mL BID   famotidine 20 mg BID   polyethylene glycol 17 g Daily   senna-docusate 8.6-50 mg 1 tablet BID   PRN  sodium chloride  Q24H PRN   acetaminophen 650 mg Q6H PRN   Dextrose 10% Bolus 12.5 g PRN   Dextrose 10% Bolus 25 g PRN   glucagon (human recombinant) 1 mg PRN   insulin aspart U-100 1-10 Units Q6H PRN   ondansetron 4 mg Q8H PRN   sodium  chloride 0.9% 10 mL PRN     Today I personally reviewed pertinent medications, lines/drains/airways, imaging, cardiology results, laboratory results, microbiology results,    Diet  Diet NPO  Diet NPO

## 2020-03-04 NOTE — ASSESSMENT & PLAN NOTE
Continuous Cardiac Monitoring  Vital Signs Q1 hour  Systolic (24hrs), Av , Min:105 , Max:269   CXR establish baseline; R/O cardiomegaly  ECHO to assess left ventricular (LV) mass, systolic and diastolic function,  assess left atrial size and function.   EKG to determine baseline rhythm; identify abnormal arrhythmias  Cardene Gtt to Maintain SBP < 140  Hold home meds; reassess need frequently

## 2020-03-04 NOTE — PLAN OF CARE
POC reviewed with pt and family at 1400. Family verbalized understanding. Questions and concerns addressed. No acute events today.  Partial code, plans to transition to comfort care made on 3/5/20. Pt on cardene gtt, propofol gtt, NS gtt.  Pt progressing toward goals. Will continue to monitor. See flowsheets for full assessment and VS info.

## 2020-03-04 NOTE — ASSESSMENT & PLAN NOTE
Vent Mode: A/C  Oxygen Concentration (%):  [] 55  Resp Rate Total:  [12 br/min-17 br/min] 13 br/min  Vt Set:  [500 mL-600 mL] 500 mL  PEEP/CPAP:  [8 cmH20] 8 cmH20  Mean Airway Pressure:  [11 ugV65-34.8 cmH20] 12 cmH20  ABG Daily  CXR Daily  Famotidine while on Vent  Chlorhexidine Oral Care while on vent   Propofol for sedation while on vent

## 2020-03-04 NOTE — NURSING
Patient arrived to [facility] from ED to [facility] 9081 via stretcher attached to portable vent and cardiac monitoring.    Type of stroke/diagnosis:  Hem. Stroke    Current symptoms: No movement BUE, no corneals, no gag/cough    Skin assessment done: Yes  Wounds noted: None  XIOMARA Armband Applied: Yes; failed XIOMARA as patient is intubated    NCC notified:  Gisel, NP aware of patients arrival to unit.

## 2020-03-04 NOTE — PROGRESS NOTES
Patient with large pontine intracerebral hemorrhage.  Etiology uncontrolled hypertension.  Patient unlikely to have meaningful recovery and is transitioning to comfort care.    Faina Dimas PA-C  Vascular Neurology  [phone]

## 2020-03-04 NOTE — H&P
[facility]  Neurocritical Care  History & Physical    Admit Date: 3/3/2020  Service Date: 03/03/2020  Length of Stay: 0    Subjective:     Chief Complaint: Brainstem hemorrhage, nontraumatic    History of Present Illness: The patient is a 60-year-old male with a PMH of HTN, DMII, CAD, and prior CVA with some left-sided weakness transferred from [facility] for evaluation and management of an ICH.  Patient was at the cardiology office today when he acutely experienced right-sided weakness.   He underwent cardiac angiogram last month and was following up with his Cardiologist today. During visit  he became acutely weak on the right side with slurred speech and facial asymmetry. EMS was called and on arrival to ED he was minimally responsive with sonorous respirations. He was intubated for airway protection and CT head was obtained which revealed Brainstem measuring 3.7 x 2.8 cm.  Intraventricular extension into the 4th ventricle without proximal hydrocephalus.  Also subarachnoid blood products were identified. According to the wife,  the patient reportedly had a recurrent headache onset since yesterday.  No reported chest pain or shortness of breath. Of note, the patient has a history of blood thinner and antiplatelet. DDAVP given prior to arrival. SBP was elevated > 200 mm Hg, cardene initiated. Decision to transfer to [facility] for further neurological care. The patient is admitted to [facility] for higher level of      Past Medical History:   Diagnosis Date    Anticoagulant long-term use     Brainstem hemorrhage, nontraumatic 3/3/2020    Diabetes mellitus, type 2     Encounter for blood transfusion     Fe deficiency anemia 7/2/2019    Lab 12/12/18    Gastritis 06/12/2019    Polyp of ascending colon 06/12/2019    Polyp of transverse colon 06/12/2019    Sigmoid polyp 06/12/2019    Sleep apnea with use of continuous positive airway pressure (CPAP)     Stage 3 chronic kidney disease     Weakness of  left arm      Past Surgical History:   Procedure Laterality Date    ATHERECTOMY  10/22/2018    Procedure: Atherectomy;  Surgeon: Taya Arellano MD;  Location: [facility] CATH;  Service: Cardiology;;    CARDIAC SURGERY      cardiac window      COLONOSCOPY  06/12/2019    per dr saravia   did not say when to repeat     CORONARY ANGIOGRAPHY Left 10/22/2018    Procedure: ANGIOGRAM, CORONARY ARTERY;  Surgeon: Taya Arellano MD;  Location: [facility] CATH;  Service: Cardiology;  Laterality: Left;    CORONARY ANGIOGRAPHY INCLUDING BYPASS GRAFTS WITH CATHETERIZATION OF LEFT HEART N/A 10/22/2018    Procedure: ANGIOGRAM, CORONARY, INCLUDING BYPASS GRAFT, WITH LEFT HEART CATHETERIZATION;  Surgeon: Taya Arellano MD;  Location: [facility] CATH;  Service: Cardiology;  Laterality: N/A;    CORONARY ANGIOGRAPHY INCLUDING BYPASS GRAFTS WITH CATHETERIZATION OF LEFT HEART N/A 1/28/2020    Procedure: ANGIOGRAM, CORONARY, INCLUDING BYPASS GRAFT, WITH LEFT HEART CATHETERIZATION;  Surgeon: Taya Arellano MD;  Location: [facility] CATH;  Service: Cardiology;  Laterality: N/A;    CORONARY ARTERY BYPASS GRAFT  02/2018    x 5 vessel per Dr. Chou     CORONARY STENT PLACEMENT N/A 10/22/2018    Procedure: INSERTION, STENT, CORONARY ARTERY;  Surgeon: Taya Arellano MD;  Location: [facility] CATH;  Service: Cardiology;  Laterality: N/A;    CORONARY STENT PLACEMENT Right 12/4/2018    Procedure: INSERTION, STENT, CORONARY ARTERY;  Surgeon: Taya Arellano MD;  Location: [facility] CATH;  Service: Cardiology;  Laterality: Right;    ESOPHAGOGASTRODUODENOSCOPY  06/12/2019    per dr saravia  did not say when to repeat    INSERTION OF DIALYSIS CATHETER  10/22/2018    Procedure: Insertion, Catheter, Dialysis;  Surgeon: Taya Arellano MD;  Location: [facility] CATH;  Service: Cardiology;;    LEFT HEART CATHETERIZATION Left 1/28/2020    Procedure: Left heart cath;  Surgeon: Taya Arellano MD;  Location: [facility] CATH;  Service: Cardiology;  Laterality: Left;    Removal of dialysis catheter      REPEAT  CLOSURE OF STERNAL INCISION N/A 8/21/2019    Procedure: CLOSURE, STERNAL INCISION, REPEAT with sternal plating;  Surgeon: Bob Chou MD;  Location: [facility] OR;  Service: Cardiovascular;  Laterality: N/A;      Current Facility-Administered Medications on File Prior to Encounter   Medication Dose Route Frequency Provider Last Rate Last Dose    [COMPLETED] desmopressin injection 4 mcg  4 mcg Intravenous ED 1 Time Markell Estevez MD   4 mcg at 03/03/20 1445    [DISCONTINUED] 0.9%  NaCl infusion (for blood administration)   Intravenous Q24H PRN Markell Estevez MD        [DISCONTINUED] niCARdipine 40 mg/200 mL infusion  1 mg/hr Intravenous Continuous Markell Estevez MD 75 mL/hr at 03/03/20 1423 15 mg/hr at 03/03/20 1423    [DISCONTINUED] propofol (DIPRIVAN) 10 mg/mL infusion  20 mcg/kg/min Intravenous Continuous Markell Estevez MD 38.9 mL/hr at 03/03/20 1440 50 mcg/kg/min at 03/03/20 1440     Current Outpatient Medications on File Prior to Encounter   Medication Sig Dispense Refill    atorvastatin (LIPITOR) 20 MG tablet Take 20 mg by mouth once daily.      carvediloL (COREG) 25 MG tablet Take 25 mg by mouth 2 (two) times daily with meals.      clopidogrel (PLAVIX) 75 mg tablet Take 1 tablet (75 mg total) by mouth once daily. 90 tablet 3    famotidine (PEPCID) 20 MG tablet Take 20 mg by mouth nightly as needed.       ferrous gluconate (FERGON) 324 MG tablet Take 324 mg by mouth daily with breakfast.       furosemide (LASIX) 40 MG tablet Take 80 mg by mouth 2 (two) times daily.       glipiZIDE (GLUCOTROL) 2.5 MG TR24 Take 5 mg by mouth daily with breakfast.      HUMALOG KWIKPEN INSULIN 100 unit/mL pen Inject into the skin 3 (three) times daily.       hydrALAZINE (APRESOLINE) 50 MG tablet Take 0.5 tablets (25 mg total) by mouth every 8 (eight) hours. (Patient taking differently: Take 50 mg by mouth every 8 (eight) hours. ) 90 tablet 11    lancets Misc To check BG 2 times daily, to use  "with insurance preferred meter 100 each 4    OZEMPIC 1 mg/dose (2 mg/1.5 mL) PnIj Weekly      pantoprazole (PROTONIX) 40 MG tablet Take 40 mg by mouth once daily. Take morning of surgery      sotalol (BETAPACE) 80 MG tablet TAKE ONE-HALF (1/2) TABLET TWICE A DAY (Patient taking differently: TAKE ONE-HALF (1/2) TABLET TWICE A DAY  --take morning of surgery) 180 tablet 3    TRESIBA FLEXTOUCH U-200 200 unit/mL (3 mL) InPn Inject 100 Units into the skin once daily. Take 50 units morning of surgery ( which is 50% of regular 100 unit dose)      [DISCONTINUED] carvediloL (COREG) 12.5 MG tablet Take 12.5 mg by mouth 2 (two) times daily.      allopurinol (ZYLOPRIM) 100 MG tablet Take 100 mg by mouth once daily. Take morning of surgery      aspirin (ECOTRIN) 81 MG EC tablet Take 81 mg by mouth once daily. Hold per MD instructions      BD ULTRA-FINE MORENO PEN NEEDLE 32 gauge x 5/32" Ndle       blood sugar diagnostic Strp To check BG 1 times daily, to use with insurance preferred meter 100 strip 4    blood-glucose meter kit To check BG 1 times daily, to use with insurance preferred meter 1 each 0    diclofenac sodium (VOLTAREN) 1 % Gel Apply 2 g topically 4 (four) times daily as needed. 100 g 5    nitroGLYCERIN (NITROSTAT) 0.4 MG SL tablet PLACE 1 TABLET UNDER THE TONGUE EVERY 5 MINUTES AS NEEDED FOR CHEST PAIN. (Patient taking differently: PLACE 1 TABLET UNDER THE TONGUE EVERY 5 MINUTES AS NEEDED FOR CHEST PAIN.  --take if needed) 100 tablet 1    ondansetron (ZOFRAN-ODT) 4 MG TbDL Take 1 tablet (4 mg total) by mouth every 6 (six) hours as needed. 12 tablet 0    sildenafil (REVATIO) 20 mg Tab Take 1 tablet (20 mg total) by mouth as needed. Take 5 tablets PRN daily 100 tablet 3    testosterone (ANDROGEL) 1 % (50 mg/5 gram) GlPk Apply 5 g topically once daily. Hold morning of surgery      [DISCONTINUED] carvedilol (COREG) 25 MG tablet Take 25 mg by mouth 2 (two) times daily. Take morning of surgery      " [DISCONTINUED] methocarbamol (ROBAXIN) 500 MG Tab Take 2 tablets (1,000 mg total) by mouth 3 (three) times daily. 20 tablet 0      Allergies: Patient has no known allergies.    Family History   Problem Relation Age of Onset    Diabetes Mother     Hyperlipidemia Mother     Hypertension Mother     Heart disease Mother         60s had CABG    Colon cancer Father      Social History     Tobacco Use    Smoking status: Former Smoker     Types: Cigars    Smokeless tobacco: Never Used    Tobacco comment: occasional cigar    Substance Use Topics    Alcohol use: Yes     Alcohol/week: 2.0 standard drinks     Types: 1 Cans of beer, 1 Shots of liquor per week     Comment: occasional    Drug use: No     Review of Systems   Unable to perform ROS: Intubated     Objective:     Vitals:    Temp: 98.6 °F (37 °C)  Pulse: 85  BP: (!) 127/54  MAP (mmHg): 78  Resp: 15  SpO2: 100 %  O2 Device (Oxygen Therapy): ventilator    Temp  Min: 97.8 °F (36.6 °C)  Max: 98.9 °F (37.2 °C)  Pulse  Min: 63  Max: 85  BP  Min: 105/51  Max: 269/123  MAP (mmHg)  Min: 74  Max: 163  Resp  Min: 13  Max: 18  SpO2  Min: 93 %  Max: 100 %  Oxygen Concentration (%)  Min: 79.8  Max: 100    No intake/output data recorded.           Physical Exam   Constitutional: He appears well-developed and well-nourished. He is intubated.   HENT:   Head: Normocephalic and atraumatic.   Eyes: Pupils are equal, round, and reactive to light. EOM are normal.   Neck: Normal range of motion. Neck supple.   Cardiovascular:   Afib history   Pulmonary/Chest: He is intubated.   Abdominal: Soft. Bowel sounds are normal.   Musculoskeletal: Normal range of motion.   Neurological:   Mental Status: eyes not open to stimulus, sedated (paused for assessment)  Intubated  Pupils  2mm, Pinpoint.   Weak gag, cough   Myoclonic LS movement  Triple flex on R side       Nursing note and vitals reviewed.    Unable to test orientation, language, memory, judgment, insight, fund of knowledge, hearing,  shoulder shrug, tongue protrusion, coordination, gait due to level of consciousness.    Today I personally reviewed pertinent medications, lines/drains/airways, imaging, laboratory results, notably:        Assessment/Plan:     Neuro  * Brainstem hemorrhage, nontraumatic  NSGY following  Vascular Neurology following  - Neurological checks q1h  - Seizure, fall, aspiration precautions  - Head of bed at 30 degrees at all times  - SBP goal 100-140  - CTH:hemorrhagic focus appears grossly stable. continued blood products within the 4th ventricle and 3rd ventricle noting there has been development of layering blood products within the bilateral lateral ventricles posteriorly.  The ventricular system is slightly increased in size, developing hydrocephalus is of concern.     - PT/OT/ST consults initiated  - Aspiration precautions, head of bed above 30 degrees  - Baseline CXR  - Continuous cardiac telemetry  -Nicardine drip (target SBP < 140)  - Monitor daily CBC,CMP, Mg, Phos  - IVFs NS @ 125  - NPO  - Place NGT/OGT  - FSBS q6hr while NPO/TF  - Check HgbA1c, TSH  - Insulin SS  - Hypoglycemia protocol  - SCDs for prophylaxis;   Consults: Neurosurgery  Vascular Neurology  Physical therapy  Occupational therapy  Speech therapy  Nutrition  Case Management  Social Work      Brain compression  Neuro Checks Q1 hr  Interval Imaging with change in LOC    IVH (intraventricular hemorrhage)  See brainstem hemorrhage  NSGY following        Pulmonary  Acute respiratory failure with hypoxia and hypercapnia  Vent Mode: PRVC  Oxygen Concentration (%):  [79.8-100] 80.1  Vt Set:  [550 mL-600 mL] 550 mL  PEEP/CPAP:  [8 cmH20] 8 cmH20  Mean Airway Pressure:  [12.8 qdF51-17.8 cmH20] 13.7 cmH20  ABG Daily  CXR Daily  Famotidine while on Vent  Chlorhexidine Oral Care while on vent   Propofol for sedation while on vent     Cardiac/Vascular  PAF (paroxysmal atrial fibrillation)  On Xarelto  Hold sotalol ASA, Plavix D/T ICH   continue to reassess  restart of home meds    Essential hypertension  Continuous Cardiac Monitoring  Vital Signs Q1 hour  Systolic (24hrs), Av , Min:105 , Max:269   CXR establish baseline; R/O cardiomegaly  ECHO to assess left ventricular (LV) mass, systolic and diastolic function,  assess left atrial size and function.   EKG to determine baseline rhythm; identify abnormal arrhythmias  Cardene Gtt to Maintain SBP < 140  Hold home meds; reassess need frequently            Hematology  Antiplatelet or antithrombotic long-term use  Currently on Xarelto  Current use of ASA/Plavix  DDAVP at outside facility  Platelets [facility] in [facility]     Endocrine  Type 2 diabetes mellitus with stage 3 chronic kidney disease, with long-term current use of insulin  A1c 6.6  RISS   POCT Q6  Nutritional Consult for dietary /caloric needs            The patient is being Prophylaxed for:  Venous Thromboembolism with: Mechanical  Stress Ulcer with: H2B  Ventilator Pneumonia with: chlorhexidine oral care    Activity Orders          Diet NPO: NPO starting at  162    Commode at bedside starting at  162        Full Code     Critical Care 40 minutes  Critical secondary to Patient has a condition that poses threat to life and bodily function:      Critical care was time spent personally by me on the following activities: development of treatment plan with patient or surrogate and bedside caregivers, discussions with consultants, evaluation of patient's response to treatment, examination of patient, ordering and performing treatments and interventions, ordering and review of laboratory studies, ordering and review of radiographic studies, pulse oximetry, re-evaluation of patient's condition. This critical care time did not overlap with that of any other provider or involve time for any procedures.      Grant Lee NP  Neurocritical Care  [facility]

## 2020-03-04 NOTE — PT/OT/SLP PROGRESS
Speech Language Pathology  Discharge    Bart Christy Jr.  MRN: [id number]    Patient not seen today secondary to pt intubated at this time. Please re-consult when medically appropriate. No further ST warranted at this time.       Abeba Steven CCC-SLP  3/4/2020

## 2020-03-05 NOTE — SIGNIFICANT EVENT
Called to bedside 2/2 Asystole on monitor    -absent breath sounds on pulmonary exam  -absent heart sounds on cardiac exam  -pupils non responsive to light  -Asystole on tele rhythm strip    Time Of Death:1446    Cause of Death:brainstem hemorrhage      Madi Osullivan MD  [facility]

## 2020-03-05 NOTE — PLAN OF CARE
Problem: Fall Injury Risk  Goal: Absence of Fall and Fall-Related Injury  Outcome: Ongoing, Progressing     Problem: Adult Inpatient Plan of Care  Goal: Plan of Care Review  Outcome: Ongoing, Progressing  Goal: Patient-Specific Goal (Individualization)  Description  Admit Date: 3/3    Admit Dx: hem stroke    Past Medical History:  No date: Anticoagulant long-term use  3/3/2020: Brainstem hemorrhage, nontraumatic  No date: Diabetes mellitus, type 2  No date: Encounter for blood transfusion  7/2/2019: Fe deficiency anemia      Comment:  Lab 12/12/18 06/12/2019: Gastritis  06/12/2019: Polyp of ascending colon  06/12/2019: Polyp of transverse colon  06/12/2019: Sigmoid polyp  No date: Sleep apnea with use of continuous positive airway pressure   (CPAP)  No date: Stage 3 chronic kidney disease  No date: Weakness of left arm    Past Surgical History:  10/22/2018: ATHERECTOMY      Comment:  Procedure: Atherectomy;  Surgeon: Taya Arellano MD;                 Location: [facility] CATH;  Service: Cardiology;;  No date: CARDIAC SURGERY  No date: cardiac window  06/12/2019: COLONOSCOPY      Comment:  per dr saravia   did not say when to repeat   10/22/2018: CORONARY ANGIOGRAPHY; Left      Comment:  Procedure: ANGIOGRAM, CORONARY ARTERY;  Surgeon: Taya Arellano MD;  Location: [facility] CATH;  Service: Cardiology;                 Laterality: Left;  10/22/2018: CORONARY ANGIOGRAPHY INCLUDING BYPASS GRAFTS WITH   CATHETERIZATION OF LEFT HEART; N/A      Comment:  Procedure: ANGIOGRAM, CORONARY, INCLUDING BYPASS GRAFT,                WITH LEFT HEART CATHETERIZATION;  Surgeon: Taya Arellano MD;  Location: [facility] CATH;  Service: Cardiology;                 Laterality: N/A;  1/28/2020: CORONARY ANGIOGRAPHY INCLUDING BYPASS GRAFTS WITH   CATHETERIZATION OF LEFT HEART; N/A      Comment:  Procedure: ANGIOGRAM, CORONARY, INCLUDING BYPASS GRAFT,                WITH LEFT HEART CATHETERIZATION;  Surgeon: Taya Arellano                 MD;  Location: [facility] CATH;  Service: Cardiology;                 Laterality: N/A;  02/2018: CORONARY ARTERY BYPASS GRAFT      Comment:  x 5 vessel per Dr. Chou   10/22/2018: CORONARY STENT PLACEMENT; N/A      Comment:  Procedure: INSERTION, STENT, CORONARY ARTERY;  Surgeon:                Taya Arellano MD;  [facility];  Service:                Cardiology;  Laterality: N/A;  12/4/2018: CORONARY STENT PLACEMENT; Right      Comment:  Procedure: INSERTION, STENT, CORONARY ARTERY;  Surgeon:                Taya Arellano MD;  [facility];  Service:                Cardiology;  Laterality: Right;  06/12/2019: ESOPHAGOGASTRODUODENOSCOPY      Comment:  per dr saravia  did not say when to repeat  10/22/2018: INSERTION OF DIALYSIS CATHETER      Comment:  Procedure: Insertion, Catheter, Dialysis;  Surgeon:                Taya Arellano MD;  Location: [facility] CATH;  Service:                Cardiology;;  1/28/2020: LEFT HEART CATHETERIZATION; Left      Comment:  Procedure: Left heart cath;  Surgeon: Taya Arellano MD;                 Location: [facility] CATH;  Service: Cardiology;  Laterality:                Left;  No date: Removal of dialysis catheter  8/21/2019: REPEAT CLOSURE OF STERNAL INCISION; N/A      Comment:  Procedure: CLOSURE, STERNAL INCISION, REPEAT with                sternal plating;  Surgeon: Bob Chou MD;                 Location: [facility] OR;  Service: Cardiovascular;  Laterality:               N/A;    Individualization:   1. Pt likes dim lights    Restraints: (date/time/why or N/A) na         Outcome: Ongoing, Progressing  Goal: Absence of Hospital-Acquired Illness or Injury  Outcome: Ongoing, Progressing  Goal: Optimal Comfort and Wellbeing  Outcome: Ongoing, Progressing  Goal: Readiness for Transition of Care  Outcome: Ongoing, Progressing  Goal: Rounds/Family Conference  Outcome: Ongoing, Progressing     Problem: Diabetes Comorbidity  Goal: Blood Glucose Level Within Desired Range  Outcome:  Ongoing, Progressing     Problem: Communication Impairment (Mechanical Ventilation, Invasive)  Goal: Effective Communication  Outcome: Ongoing, Progressing     Problem: Device-Related Complication Risk (Mechanical Ventilation, Invasive)  Goal: Optimal Device Function  Outcome: Ongoing, Progressing     Problem: Inability to Wean (Mechanical Ventilation, Invasive)  Goal: Mechanical Ventilation Liberation  Outcome: Ongoing, Progressing     Problem: Nutrition Impairment (Mechanical Ventilation, Invasive)  Goal: Optimal Nutrition Delivery  Outcome: Ongoing, Progressing     Problem: Skin and Tissue Injury (Mechanical Ventilation, Invasive)  Goal: Absence of Device-Related Skin and Tissue Injury  Outcome: Ongoing, Progressing     Problem: Ventilator-Induced Lung Injury (Mechanical Ventilation, Invasive)  Goal: Absence of Ventilator-Induced Lung Injury  Outcome: Ongoing, Progressing     Problem: Communication Impairment (Artificial Airway)  Goal: Effective Communication  Outcome: Ongoing, Progressing     Problem: Device-Related Complication Risk (Artificial Airway)  Goal: Optimal Device Function  Outcome: Ongoing, Progressing     Problem: Skin and Tissue Injury (Artificial Airway)  Goal: Absence of Device-Related Skin or Tissue Injury  Outcome: Ongoing, Progressing     Problem: Infection  Goal: Infection Symptom Resolution  Outcome: Ongoing, Progressing     Problem: Adjustment to Illness (Stroke, Hemorrhagic)  Goal: Optimal Coping  Outcome: Ongoing, Progressing     Problem: Bowel Elimination Impaired (Stroke, Hemorrhagic)  Goal: Effective Bowel Elimination  Outcome: Ongoing, Progressing     Problem: Cerebral Tissue Perfusion Risk (Stroke, Hemorrhagic)  Goal: Optimal Cerebral Tissue Perfusion  Outcome: Ongoing, Progressing     Problem: Communication Impairment (Stroke, Hemorrhagic)  Goal: Improved Communication Skills and Cognitive Function  Outcome: Ongoing, Progressing

## 2020-03-05 NOTE — DISCHARGE SUMMARY
Death Note  Critical Care Medicine      Admit Date: 3/3/2020    Date of Death: 2020    Time of Death: 1446    Attending Physician: Madi Osullivan MD    Principal Diagnoses: Brainstem hemorrhage, nontraumatic    Preliminary Cause of Death: Brainstem hemorrhage, nontraumatic    Secondary Diagnoses:   Active Hospital Problems    Diagnosis  POA    *Brainstem hemorrhage, nontraumatic [I61.3]  Unknown    IVH (intraventricular hemorrhage) [I61.5]  Unknown    Brain compression [G93.5]  Unknown    Antiplatelet or antithrombotic long-term use [Z79.02]  Not Applicable    Acute respiratory failure with hypoxia and hypercapnia [J96.01, J96.02]  Unknown    PAF (paroxysmal atrial fibrillation) [I48.0]  Yes    Type 2 diabetes mellitus with stage 3 chronic kidney disease, with long-term current use of insulin [E11.22, N18.3, Z79.4]  Not Applicable    Essential hypertension [I10]  Yes      Resolved Hospital Problems   No resolved problems to display.        Discharged Condition:     HPI:  The patient is a 60-year-old male with a PMH of HTN, DMII, CAD, and prior CVA with some left-sided weakness transferred from [facility] for evaluation and management of an ICH.  Patient was at the cardiology office today when he acutely experienced right-sided weakness.   He underwent cardiac angiogram last month and was following up with his Cardiologist today. During visit  he became acutely weak on the right side with slurred speech and facial asymmetry. EMS was called and on arrival to ED he was minimally responsive with sonorous respirations. He was intubated for airway protection and CT head was obtained which revealed Brainstem measuring 3.7 x 2.8 cm.  Intraventricular extension into the 4th ventricle without proximal hydrocephalus.  Also subarachnoid blood products were identified. According to the wife,  the patient reportedly had a recurrent headache onset since yesterday.  No reported chest pain or shortness of  breath. Of note, the patient has a history of blood thinner and antiplatelet. DDAVP given prior to arrival. SBP was elevated > 200 mm Hg, cardene initiated. Decision to transfer to [facility] for further neurological care. The patient is admitted to [facility] for higher level of      Hospital/ICU Course:  03/04/2020 Intubated, on cardene and on propofol. Refused EVD at this time and would prefer maximum medical management. Family aware of poor prognosis, awaiting arrival of other family members at this time that are out of the country.       Consultations were held with the family regarding the patient's expected poor prognosis. At the direction of the family, the patient was extubated and measures to ensure the comfort of the patient including, but not limited to, morphine as needed for pain and air hunger as well as benzodiazepines as needed for agitation. The patient was subsequently declared dead.        The above is purely a summary of documentation by other providers. This discharge summary is in no way a substitute for medical documentation that has been provided throughout the stay by care-giving providers. Please reference all documentation in the   Electronic medical record should any questions or concerns arise.

## 2020-03-05 NOTE — PLAN OF CARE
POC reviewed with pt at 0500. Pt unable to verbalize understanding. Cardene, NS, and Propofol continued.Tylenol x1. Overnight bath. Contacted CHETNA. CORINNA no movement. Plans to withdrawal care today. Questions and concerns addressed. No acute events overnight. Pt progressing toward goals. Will continue to monitor. See flowsheets for full assessment and VS info

## 2020-03-05 NOTE — PT/OT/SLP DISCHARGE
Occupational Therapy Discharge Summary    Bart Christy Jr.  MRN: [id number]   Principal Problem: Brainstem hemorrhage, nontraumatic      Patient Discharged from acute Occupational Therapy on 3/5.    Assessment:      Transitioning to comfort care    Objective:     GOALS:   Multidisciplinary Problems     Occupational Therapy Goals        Problem: Occupational Therapy Goal    Goal Priority Disciplines Outcome Interventions   Occupational Therapy Goal     OT, PT/OT Ongoing, Progressing    Description:  Goals set 3/4 to be addressed for 14 days with expiration date, 3/18:  Patient will increase functional independence with ADLs by performing:    Patient's family / caregiver will demonstrate independence with providing ROM and changes in bed positioning.   Not met                          Reasons for Discontinuation of Therapy Services  Transfer to alternate level of care.      Plan:     Patient Discharged to: D/C from OT services on acute; transitioning to comfort care    ISI Rubalacva  3/5/2020

## 2020-03-05 NOTE — PLAN OF CARE
20 1650   Final Note   Assessment Type Final Discharge Note   Anticipated Discharge Disposition        Admit Date: 3/3/2020     Date of Death: 2020     Time of Death: 1446       Luz Bacon RN, CCRN-K, [facility]  Neuro-Critical Care   [phone]

## 2020-03-06 LAB — POCT GLUCOSE: 100 MG/DL (ref 70–110)

## 2021-09-08 NOTE — CONSULTS
Inpatient consult to Physical Medicine Rehab  Consult performed by: Kathy Arriaza NP  Consult ordered by: Grant Lee NP  Reason for consult: Assess rehab needs      Patient is too acute at this time; rehab potential cannot be appropriately assessed.  Recommend early mobility, OOB in chair, and PT/OT/SLP when appropriate.  Will follow for additional rehab needs and post-acute recommendation when patient is medically stable and able to participate with therapy.    Thank you for consult.    SEAMUS Moran, FNP-C  Physical Medicine & Rehabilitation   03/04/2020     Yes

## 2023-08-23 NOTE — PT/OT/SLP PROGRESS
VT on review of device with symptoms and will add another ATP/shock zone to cover rate  RHC and echocardiogram      Constitutional: alert, oriented, normal gait and station, normal mentation.  Oral: moist mucous membrans  Lymph: without pathologic adenopathy  Chest: clear to ausculation and percussion  Cor: No evidence of left or right ventricular activity.  Rhythm is regular.  S1 normal, S2 split physiologically. Murmurs are not present  Abdomen: without tenderness, rebound, guarding, masses, ascites  Extremities: Edema not present  Neuro: no focal defects, normal mentation  Skin: without open lesions  Psych: oriented, verbal, mental status in tact       Current Outpatient Medications   Medication    ammonium lactate (AMLACTIN) 12 % external cream    apixaban ANTICOAGULANT (ELIQUIS ANTICOAGULANT) 2.5 MG tablet    atorvastatin (LIPITOR) 40 MG tablet    B Complex-C-Folic Acid (TRISTEN-OWEN RX) 1 mg TABS    blood glucose (ACCU-CHEK GUIDE) test strip    carvedilol (COREG) 25 MG tablet    COMPRESSION STOCKINGS    Epoetin Isaías (EPOGEN IJ)    erythromycin (ROMYCIN) 5 MG/GM ophthalmic ointment    febuxostat (ULORIC) 40 MG TABS tablet    insulin glargine (LANTUS SOLOSTAR) 100 UNIT/ML pen    insulin pen needle (BD ANGELA U/F) 32G X 4 MM miscellaneous    Iron Sucrose (VENOFER IV)    NOVOLOG FLEXPEN 100 UNIT/ML soln    ONETOUCH ULTRA test strip    order for DME    order for DME    ORDER FOR DME    polyethylene glycol (MIRALAX) 17 GM/Dose powder    ammonium lactate (AMLACTIN) 12 % external cream    amoxicillin (AMOXIL) 500 MG capsule    budesonide (PULMICORT) 0.5 MG/2ML neb solution    cetirizine (ZYRTEC) 10 MG tablet    hydrocortisone 2.5 % cream    isosorbide dinitrate (ISORDIL) 20 MG tablet    mupirocin (BACTROBAN) 2 % external ointment    naphazoline-pheniramine (NAPHCON-A) 0.025-0.3 % ophthalmic solution    rifampin (RIFADIN) 300 MG capsule    sulfamethoxazole-trimethoprim (BACTRIM) 400-80 MG tablet    torsemide (DEMADEX)  Physical Therapy      Patient Name:  Bart Christy JrMurphy   MRN:  [id number]    Patient not seen today secondary to Other (Comment)(discharged PT orders, patient transitioning to comfort care).     Lara Urban, PT     100 MG tablet    triamcinolone (KENALOG) 0.1 % external cream    UNABLE TO FIND    vitamin D3 (VITAMIN D3) 50 mcg (2000 units) tablet     No current facility-administered medications for this visit.        Wt Readings from Last 24 Encounters:   08/23/23 95.7 kg (211 lb)   08/08/23 95.7 kg (211 lb)   05/16/23 96.4 kg (212 lb 9.6 oz)   04/20/23 95.9 kg (211 lb 8 oz)   04/06/23 95 kg (209 lb 6.4 oz)   03/21/23 92.5 kg (204 lb)   09/22/22 92.6 kg (204 lb 3.2 oz)   08/09/22 95.3 kg (210 lb)   07/19/22 95.3 kg (210 lb 3.2 oz)   07/19/22 99.8 kg (220 lb)   06/28/22 99.8 kg (220 lb)   05/31/22 99.8 kg (220 lb)   04/05/22 100.7 kg (222 lb)   10/26/21 108.1 kg (238 lb 4.8 oz)   10/05/21 100.7 kg (222 lb)   09/21/21 103.7 kg (228 lb 11.2 oz)   08/03/21 100.9 kg (222 lb 8 oz)   05/14/21 100.4 kg (221 lb 6.4 oz)   04/27/21 100.7 kg (222 lb)   04/14/21 99.7 kg (219 lb 12.8 oz)   04/01/21 99.7 kg (219 lb 12.8 oz)   03/14/21 101.5 kg (223 lb 12.8 oz)   01/27/21 103.4 kg (228 lb)   01/20/21 103.5 kg (228 lb 1.6 oz)         Latest Reference Range & Units 06/22/23 11:18 08/23/23 14:47   Sodium 136 - 145 mmol/L  134 (L)   Potassium 3.4 - 5.3 mmol/L  4.3   Chloride 98 - 107 mmol/L  93 (L)   Carbon Dioxide (CO2) 22 - 29 mmol/L  27   Urea Nitrogen 8.0 - 23.0 mg/dL  33.9 (H)   Creatinine 0.67 - 1.17 mg/dL  5.63 (H)   GFR Estimate >60 mL/min/1.73m2  11 (L)   Calcium 8.8 - 10.2 mg/dL  9.4   Anion Gap 7 - 15 mmol/L  14   Albumin 3.5 - 5.2 g/dL  4.6   Protein Total 6.4 - 8.3 g/dL  7.5   Alkaline Phosphatase 40 - 129 U/L  163 (H)   ALT 0 - 70 U/L  59   AST 0 - 45 U/L  37   Bilirubin Total <=1.2 mg/dL  0.7   Glucose 70 - 99 mg/dL  121 (H)   WBC 4.0 - 11.0 10e3/uL  6.2   Hemoglobin 13.3 - 17.7 g/dL  10.9 (L)   Hematocrit 40.0 - 53.0 %  32.1 (L)   Platelet Count 150 - 450 10e3/uL  113 (L)   RBC Count 4.40 - 5.90 10e6/uL  3.30 (L)   MCV 78 - 100 fL  97   MCH 26.5 - 33.0 pg  33.0   MCHC 31.5 - 36.5 g/dL  34.0   RDW 10.0 - 15.0 %  15.0   CARDIAC  DEVICE CHECK - IN CLINIC  Rpt    (L): Data is abnormally low  (H): Data is abnormally high  Rpt: View report in Results Review for more information    me: CUSHING, HARRY C  MRN: [id number]  : 1957  Study Date: 2022 08:38 AM  Age: 65 yrs  Gender: Male  Patient Location: [facility]  Reason For Study: Chronic diastolic congestive heart failure (H), Pulmonary  hypert  Ordering Physician: RODO PORTILLO  Referring Physician: RODO PORTILLO  Performed By: Bill Castano RDCS     BSA: 2.2 m2  Height: 72 in  Weight: 220 lb  HR: 87  BP: 109/58 mmHg  ______________________________________________________________________________  Procedure  Echocardiogram with two-dimensional, color and spectral Doppler performed.  ______________________________________________________________________________  Interpretation Summary  Left ventricular wall thickness is normal. Left ventricular size is normal.  The visual ejection fraction is 50-55%. Flattened septum is consistent with  right ventricular pressure overload.  The right ventricle is normal size. Global right ventricular function is  mildly to moderately reduced.  A bioprosthetic aortic valve is present. Doppler interrogation of the aortic  valve is normal with a mean gradient of 5mmHg. There is trace valvular AI  Mild tricuspid insufficiency is present.  Right ventricular systolic pressure is 50mmHg above the right atrial pressure.  IVC diameter >2.1 cm collapsing <50% with sniff suggests a high RA pressure  estimated at 15 mmHg or greater. No pericardial effusion is present.     Overall no significant change  ______________________________________________________________________________  Left Ventricle  Left ventricular wall thickness is normal. Left ventricular size is normal.  The visual ejection fraction is 50-55%. Left ventricular diastolic function is  indeterminate. Flattened septum is consistent with right ventricular pressure  overload.     Right  Ventricle  The right ventricle is normal size. Global right ventricular function is  mildly to moderately reduced. A pacemaker lead is noted in the right  ventricle.     Atria  Moderate biatrial enlargement is present.     Mitral Valve  Mild to moderate mitral annular calcification is present. Trace mitral  insufficiency is present.     Aortic Valve  A bioprosthetic aortic valve is present. Doppler interrogation of the aortic  valve is normal with a mean gradient of 5mmHg. There is trace valvular AI.     Tricuspid Valve  The tricuspid valve is normal. Mild tricuspid insufficiency is present. Right  ventricular systolic pressure is 50mmHg above the right atrial pressure.     Pulmonic Valve  The pulmonic valve is normal.     Vessels  The aorta root is normal. IVC diameter >2.1 cm collapsing <50% with sniff  suggests a high RA pressure estimated at 15 mmHg or greater.     Pericardium  No pericardial effusion is present.     Compared to Previous Study  Overall no significant change.  ______________________________________________________________________________  MMode/2D Measurements & Calculations  IVSd: 1.2 cm     LVIDd: 5.9 cm  LVIDs: 4.7 cm  LVPWd: 1.3 cm  FS: 19.4 %  LV mass(C)d: 324.0 grams  LV mass(C)dI: 146.0 grams/m2  LA dimension: 5.2 cm  LVOT diam: 2.2 cm  LVOT area: 3.7 cm2  LA Volume (BP): 105.0 ml  LA Volume Index (BP): 47.3 ml/m2  RWT: 0.45     Doppler Measurements & Calculations  Ao V2 max: 158.4 cm/sec  Ao max PG: 10.0 mmHg  Ao V2 mean: 97.6 cm/sec  Ao mean P.7 mmHg  Ao V2 VTI: 32.3 cm  DIPIKA(I,D): 2.2 cm2  DIPIKA(V,D): 2.2 cm2  LV V1 max PG: 3.6 mmHg  LV V1 max: 94.3 cm/sec  LV V1 VTI: 19.8 cm  SV(LVOT): 72.6 ml  SI(LVOT): 32.7 ml/m2  TR max marlo: 297.6 cm/sec  TR max P.0 mmHg  AV Marlo Ratio (DI): 0.60  DIPIKA Index (cm2/m2): 1.0
